# Patient Record
Sex: FEMALE | Race: WHITE | NOT HISPANIC OR LATINO | Employment: OTHER | URBAN - METROPOLITAN AREA
[De-identification: names, ages, dates, MRNs, and addresses within clinical notes are randomized per-mention and may not be internally consistent; named-entity substitution may affect disease eponyms.]

---

## 2020-10-23 ENCOUNTER — OFFICE VISIT (OUTPATIENT)
Dept: URGENT CARE | Facility: CLINIC | Age: 65
End: 2020-10-23
Payer: COMMERCIAL

## 2020-10-23 VITALS
WEIGHT: 140 LBS | SYSTOLIC BLOOD PRESSURE: 136 MMHG | BODY MASS INDEX: 22.5 KG/M2 | OXYGEN SATURATION: 97 % | TEMPERATURE: 98.6 F | HEART RATE: 89 BPM | HEIGHT: 66 IN | DIASTOLIC BLOOD PRESSURE: 82 MMHG | RESPIRATION RATE: 16 BRPM

## 2020-10-23 DIAGNOSIS — B96.89 BACTERIAL VAGINOSIS: Primary | ICD-10-CM

## 2020-10-23 DIAGNOSIS — R30.0 DYSURIA: ICD-10-CM

## 2020-10-23 DIAGNOSIS — N76.0 BACTERIAL VAGINOSIS: Primary | ICD-10-CM

## 2020-10-23 LAB
APPEARANCE UR: CLEAR
BILIRUB UR STRIP-MCNC: NEGATIVE MG/DL
COLOR UR AUTO: YELLOW
GLUCOSE UR STRIP.AUTO-MCNC: NEGATIVE MG/DL
KETONES UR STRIP.AUTO-MCNC: NEGATIVE MG/DL
LEUKOCYTE ESTERASE UR QL STRIP.AUTO: NEGATIVE
NITRITE UR QL STRIP.AUTO: NEGATIVE
PH UR STRIP.AUTO: 7 [PH] (ref 5–8)
PROT UR QL STRIP: NEGATIVE MG/DL
RBC UR QL AUTO: NEGATIVE
SP GR UR STRIP.AUTO: 1.01
UROBILINOGEN UR STRIP-MCNC: 0.2 MG/DL

## 2020-10-23 PROCEDURE — 99203 OFFICE O/P NEW LOW 30 MIN: CPT | Performed by: PHYSICIAN ASSISTANT

## 2020-10-23 PROCEDURE — 81002 URINALYSIS NONAUTO W/O SCOPE: CPT | Performed by: PHYSICIAN ASSISTANT

## 2020-10-23 RX ORDER — METRONIDAZOLE 7.5 MG/G
1 GEL VAGINAL
Qty: 5 EACH | Refills: 1 | Status: SHIPPED | OUTPATIENT
Start: 2020-10-23 | End: 2020-10-28

## 2020-10-23 RX ORDER — LISINOPRIL 20 MG/1
20 TABLET ORAL
COMMUNITY
Start: 2020-09-21 | End: 2021-06-30 | Stop reason: SDUPTHER

## 2020-10-23 ASSESSMENT — ENCOUNTER SYMPTOMS
VAGINITIS: 1
CHILLS: 0
FLANK PAIN: 0
FEVER: 0

## 2020-10-23 NOTE — PATIENT INSTRUCTIONS
Bacterial Vaginosis    Bacterial vaginosis is an infection of the vagina. It happens when too many normal germs (healthy bacteria) grow in the vagina. This infection puts you at risk for infections from sex (STIs). Treating this infection can lower your risk for some STIs. You should also treat this if you are pregnant. It can cause your baby to be born early.  Follow these instructions at home:  Medicines  · Take over-the-counter and prescription medicines only as told by your doctor.  · Take or use your antibiotic medicine as told by your doctor. Do not stop taking or using it even if you start to feel better.  General instructions  · If you your sexual partner is a woman, tell her that you have this infection. She needs to get treatment if she has symptoms. If you have a male partner, he does not need to be treated.  · During treatment:  ? Avoid sex.  ? Do not douche.  ? Avoid alcohol as told.  ? Avoid breastfeeding as told.  · Drink enough fluid to keep your pee (urine) clear or pale yellow.  · Keep your vagina and butt (rectum) clean.  ? Wash the area with warm water every day.  ? Wipe from front to back after you use the toilet.  · Keep all follow-up visits as told by your doctor. This is important.  Preventing this condition  · Do not douche.  · Use only warm water to wash around your vagina.  · Use protection when you have sex. This includes:  ? Latex condoms.  ? Dental dams.  · Limit how many people you have sex with. It is best to only have sex with the same person (be monogamous).  · Get tested for STIs. Have your partner get tested.  · Wear underwear that is cotton or lined with cotton.  · Avoid tight pants and pantyhose. This is most important in summer.  · Do not use any products that have nicotine or tobacco in them. These include cigarettes and e-cigarettes. If you need help quitting, ask your doctor.  · Do not use illegal drugs.  · Limit how much alcohol you drink.  Contact a doctor if:  · Your  symptoms do not get better, even after you are treated.  · You have more discharge or pain when you pee (urinate).  · You have a fever.  · You have pain in your belly (abdomen).  · You have pain with sex.  · Your bleed from your vagina between periods.  Summary  · This infection happens when too many germs (bacteria) grow in the vagina.  · Treating this condition can lower your risk for some infections from sex (STIs).  · You should also treat this if you are pregnant. It can cause early (premature) birth.  · Do not stop taking or using your antibiotic medicine even if you start to feel better.  This information is not intended to replace advice given to you by your health care provider. Make sure you discuss any questions you have with your health care provider.  Document Released: 09/26/2009 Document Revised: 11/30/2018 Document Reviewed: 09/02/2017  Elsevier Patient Education © 2020 [employer] Inc[employer]

## 2020-10-23 NOTE — PROGRESS NOTES
Subjective:      Alicia To is a 64 y.o. female who presents with Vaginal Discharge (over week, white discharge)    Medications:    • lisinopril Tabs    Allergies: Patient has no known allergies.    Problem List: Alicia To does not have a problem list on file.    Surgical History:  No past surgical history on file.    Past Social Hx: Alicia To  reports that she has been smoking. She has never used smokeless tobacco. She reports current alcohol use.     Past Family Hx:  Alicia To family history is not on file.     Problem list, medications, and allergies reviewed by myself today in Epic.         Patient presents with:  Vaginal Discharge: over week, white discharge. Pt has history of recurrent BV, a few times a year, typical symptoms for BV.  Thought she had a refill, but does not.  Pt denies new partner, rash , lesions, or any other complaint.  Pt is new to [facility] and does not have PCP so she came here.         Vaginitis  The patient's primary symptoms include genital itching, a genital odor and vaginal discharge. The patient's pertinent negatives include no genital rash. This is a new problem. The current episode started in the past 7 days. The problem occurs constantly. The problem has been gradually worsening. The problem affects both sides. She is not pregnant. Associated symptoms include dysuria (mild stinging). Pertinent negatives include no chills, fever, flank pain, frequency, hematuria, rash or urgency. The vaginal discharge was white, watery and malodorous. There has been no bleeding. She has not been passing clots. She has not been passing tissue. The symptoms are aggravated by tactile pressure and urinating. She has tried nothing for the symptoms. The treatment provided no relief. She is not sexually active. Her past medical history is significant for vaginosis.       Review of Systems   Constitutional: Negative for chills and fever.   Genitourinary: Positive for dysuria (mild stinging)  "and vaginal discharge. Negative for flank pain, frequency, hematuria and urgency.   Skin: Negative for rash.   All other systems reviewed and are negative.         Objective:     /82   Pulse 89   Temp 37 °C (98.6 °F) (Temporal)   Resp 16   Ht 1.666 m (5' 5.6\")   Wt 63.5 kg (140 lb)   SpO2 97%   BMI 22.87 kg/m²      Physical Exam  Vitals signs and nursing note reviewed.   Constitutional:       General: She is not in acute distress.     Appearance: Normal appearance. She is well-developed. She is not ill-appearing or toxic-appearing.   HENT:      Head: Normocephalic and atraumatic.      Right Ear: Tympanic membrane normal.      Left Ear: Tympanic membrane normal.      Nose: Nose normal.      Mouth/Throat:      Lips: Pink.      Mouth: Mucous membranes are moist.      Pharynx: Oropharynx is clear. Uvula midline.   Eyes:      Extraocular Movements: Extraocular movements intact.      Conjunctiva/sclera: Conjunctivae normal.      Pupils: Pupils are equal, round, and reactive to light.   Neck:      Musculoskeletal: Normal range of motion and neck supple.   Cardiovascular:      Rate and Rhythm: Normal rate and regular rhythm.      Pulses: Normal pulses.      Heart sounds: Normal heart sounds.   Pulmonary:      Effort: Pulmonary effort is normal.      Breath sounds: Normal breath sounds.   Abdominal:      General: Bowel sounds are normal.      Palpations: Abdomen is soft.   Genitourinary:     Comments: Deferred exam, patient has old Rx box for metrogel, typical onset and sxs.   Musculoskeletal: Normal range of motion.   Skin:     General: Skin is warm and dry.      Capillary Refill: Capillary refill takes less than 2 seconds.   Neurological:      General: No focal deficit present.      Mental Status: She is alert and oriented to person, place, and time.      Cranial Nerves: No cranial nerve deficit.      Motor: Motor function is intact.      Coordination: Coordination is intact.      Gait: Gait normal. "   Psychiatric:         Mood and Affect: Mood normal.            UA results interpreted by me: neg dip     Assessment/Plan:         1. Bacterial vaginosis  metroNIDAZOLE (METROGEL-VAGINAL) 0.75 % Gel   2. Dysuria  POCT Urinalysis    metroNIDAZOLE (METROGEL-VAGINAL) 0.75 % Gel     PT to begin medications today as prescribed.    PT should follow up with PCP in 1-2 days for re-evaluation if symptoms have not improved.  Discussed red flags and reasons to return to UC or ED.  Pt and/or family verbalized understanding of diagnosis and follow up instructions and was offered informational handout on diagnosis.  PT discharged.

## 2021-02-10 ENCOUNTER — TELEPHONE (OUTPATIENT)
Dept: SCHEDULING | Facility: IMAGING CENTER | Age: 66
End: 2021-02-10

## 2021-02-23 ENCOUNTER — OFFICE VISIT (OUTPATIENT)
Dept: URGENT CARE | Facility: CLINIC | Age: 66
End: 2021-02-23
Payer: COMMERCIAL

## 2021-02-23 ENCOUNTER — HOSPITAL ENCOUNTER (OUTPATIENT)
Facility: MEDICAL CENTER | Age: 66
End: 2021-02-23
Attending: FAMILY MEDICINE
Payer: COMMERCIAL

## 2021-02-23 VITALS
HEIGHT: 66 IN | WEIGHT: 133 LBS | OXYGEN SATURATION: 98 % | DIASTOLIC BLOOD PRESSURE: 86 MMHG | RESPIRATION RATE: 16 BRPM | TEMPERATURE: 97.1 F | BODY MASS INDEX: 21.38 KG/M2 | SYSTOLIC BLOOD PRESSURE: 132 MMHG | HEART RATE: 78 BPM

## 2021-02-23 DIAGNOSIS — M54.9 DORSALGIA: ICD-10-CM

## 2021-02-23 LAB
APPEARANCE UR: CLEAR
BILIRUB UR STRIP-MCNC: NEGATIVE MG/DL
COLOR UR AUTO: YELLOW
GLUCOSE UR STRIP.AUTO-MCNC: NEGATIVE MG/DL
KETONES UR STRIP.AUTO-MCNC: NEGATIVE MG/DL
LEUKOCYTE ESTERASE UR QL STRIP.AUTO: NEGATIVE
NITRITE UR QL STRIP.AUTO: NEGATIVE
PH UR STRIP.AUTO: 7 [PH] (ref 5–8)
PROT UR QL STRIP: NEGATIVE MG/DL
RBC UR QL AUTO: NORMAL
SP GR UR STRIP.AUTO: 1.01
UROBILINOGEN UR STRIP-MCNC: 0.2 MG/DL

## 2021-02-23 PROCEDURE — 87086 URINE CULTURE/COLONY COUNT: CPT

## 2021-02-23 PROCEDURE — 81002 URINALYSIS NONAUTO W/O SCOPE: CPT | Performed by: FAMILY MEDICINE

## 2021-02-23 PROCEDURE — 99214 OFFICE O/P EST MOD 30 MIN: CPT | Performed by: FAMILY MEDICINE

## 2021-02-23 RX ORDER — NITROFURANTOIN 25; 75 MG/1; MG/1
100 CAPSULE ORAL 2 TIMES DAILY
Qty: 10 CAPSULE | Refills: 0 | Status: SHIPPED | OUTPATIENT
Start: 2021-02-23 | End: 2021-02-28

## 2021-02-23 RX ORDER — KETOROLAC TROMETHAMINE 30 MG/ML
30 INJECTION, SOLUTION INTRAMUSCULAR; INTRAVENOUS ONCE
Status: COMPLETED | OUTPATIENT
Start: 2021-02-23 | End: 2021-02-23

## 2021-02-23 RX ADMIN — KETOROLAC TROMETHAMINE 30 MG: 30 INJECTION, SOLUTION INTRAMUSCULAR; INTRAVENOUS at 13:38

## 2021-02-23 ASSESSMENT — ENCOUNTER SYMPTOMS
MYALGIAS: 1
BACK PAIN: 1

## 2021-02-23 NOTE — PROGRESS NOTES
"Subjective:      Alicia To is a 65 y.o. female who presents with Back Pain (Lower left back pain x 3 days \"possible kidney infection\")    - This is a pleasant and nontoxic appearing 65 y.o. female with c/o lower Lt sided back/SI region pain for ~ 3-4 days. Fairly constant and worse w/ certain movements and positions. She is worried she has a kidney infection and needs abx.     No fever, no trauma, no bowel/bladder dysfunction or lower limb weakness/heaviness.             ALLERGIES:  Patient has no known allergies.     PMH:  History reviewed. No pertinent past medical history.     PSH:  History reviewed. No pertinent surgical history.    MEDS:    Current Outpatient Medications:   •  nitrofurantoin (MACROBID) 100 MG Cap, Take 1 capsule by mouth 2 times a day for 5 days., Disp: 10 capsule, Rfl: 0  •  lisinopril (PRINIVIL) 20 MG Tab, Take 20 mg by mouth every day., Disp: , Rfl:     Current Facility-Administered Medications:   •  ketorolac (TORADOL) injection 30 mg, 30 mg, Intramuscular, Once, Javad Mondragon M.D.    ** I have documented what I find to be significant in regards to past medical, social, family and surgical history  in my HPI or under PMH/PSH/FH review section, otherwise it is noncontributory **             HPI    Review of Systems   Musculoskeletal: Positive for back pain and myalgias.   All other systems reviewed and are negative.         Objective:     /86 (BP Location: Left arm, Patient Position: Sitting, BP Cuff Size: Adult)   Pulse 78   Temp 36.2 °C (97.1 °F) (Temporal)   Resp 16   Ht 1.676 m (5' 6\")   Wt 60.3 kg (133 lb)   SpO2 98%   BMI 21.47 kg/m²      Physical Exam  Vitals and nursing note reviewed.   Constitutional:       General: She is not in acute distress.     Appearance: She is well-developed. She is not diaphoretic.   HENT:      Head: Normocephalic and atraumatic.   Eyes:      General: No scleral icterus.     Conjunctiva/sclera: Conjunctivae normal.   Cardiovascular:    "   Heart sounds: Normal heart sounds. No murmur.   Pulmonary:      Effort: Pulmonary effort is normal. No respiratory distress.      Breath sounds: Normal breath sounds.   Abdominal:      Palpations: Abdomen is soft.      Tenderness: There is no abdominal tenderness.   Musculoskeletal:        Legs:       Comments: + TTP   Skin:     Coloration: Skin is not pale.      Findings: No rash.   Neurological:      Mental Status: She is alert.      Motor: No abnormal muscle tone.   Psychiatric:         Mood and Affect: Mood normal.         Behavior: Behavior normal.         Judgment: Judgment normal.                 Assessment/Plan:            1. Dorsalgia  POCT Urinalysis    ketorolac (TORADOL) injection 30 mg    nitrofurantoin (MACROBID) 100 MG Cap    URINE CULTURE(NEW)     ** seems more MSK. Trial otc motrin. Recheck UA w/ PCP appointment in couple weeks advised     - Dx, plan & d/c instructions discussed w/ patient and/or family members  - Rest, stay hydrated OTC Motrin and/or Tylenol as needed  - E.R. precautions discussed       Follow up with their PCP in 2-3 days strongly advised, ER if not improving or feeling/getting worse.    Any realistic side effects of medications that may have been given today (i.e. Rash, GI upset/constipation, sedation, elevation of BP or blood sugars) discussed.     Patient left in stable condition

## 2021-02-25 LAB
BACTERIA UR CULT: NORMAL
SIGNIFICANT IND 70042: NORMAL
SITE SITE: NORMAL
SOURCE SOURCE: NORMAL

## 2021-03-15 DIAGNOSIS — Z23 NEED FOR VACCINATION: ICD-10-CM

## 2021-03-15 SDOH — HEALTH STABILITY: PHYSICAL HEALTH: ON AVERAGE, HOW MANY MINUTES DO YOU ENGAGE IN EXERCISE AT THIS LEVEL?: 20 MINUTES

## 2021-03-15 SDOH — ECONOMIC STABILITY: HOUSING INSECURITY
IN THE LAST 12 MONTHS, WAS THERE A TIME WHEN YOU DID NOT HAVE A STEADY PLACE TO SLEEP OR SLEPT IN A SHELTER (INCLUDING NOW)?: NO

## 2021-03-15 SDOH — ECONOMIC STABILITY: HOUSING INSECURITY: IN THE LAST 12 MONTHS, HOW MANY PLACES HAVE YOU LIVED?: 2

## 2021-03-15 SDOH — ECONOMIC STABILITY: INCOME INSECURITY: IN THE LAST 12 MONTHS, WAS THERE A TIME WHEN YOU WERE NOT ABLE TO PAY THE MORTGAGE OR RENT ON TIME?: NO

## 2021-03-15 SDOH — ECONOMIC STABILITY: TRANSPORTATION INSECURITY
IN THE PAST 12 MONTHS, HAS LACK OF RELIABLE TRANSPORTATION KEPT YOU FROM MEDICAL APPOINTMENTS, MEETINGS, WORK OR FROM GETTING THINGS NEEDED FOR DAILY LIVING?: NO

## 2021-03-15 SDOH — HEALTH STABILITY: MENTAL HEALTH
STRESS IS WHEN SOMEONE FEELS TENSE, NERVOUS, ANXIOUS, OR CAN'T SLEEP AT NIGHT BECAUSE THEIR MIND IS TROUBLED. HOW STRESSED ARE YOU?: TO SOME EXTENT

## 2021-03-15 SDOH — HEALTH STABILITY: PHYSICAL HEALTH: ON AVERAGE, HOW MANY DAYS PER WEEK DO YOU ENGAGE IN MODERATE TO STRENUOUS EXERCISE (LIKE A BRISK WALK)?: 3 DAYS

## 2021-03-15 SDOH — ECONOMIC STABILITY: TRANSPORTATION INSECURITY
IN THE PAST 12 MONTHS, HAS THE LACK OF TRANSPORTATION KEPT YOU FROM MEDICAL APPOINTMENTS OR FROM GETTING MEDICATIONS?: NO

## 2021-03-15 ASSESSMENT — SOCIAL DETERMINANTS OF HEALTH (SDOH)
HOW OFTEN DO YOU HAVE SIX OR MORE DRINKS ON ONE OCCASION: WEEKLY
WITHIN THE PAST 12 MONTHS, THE FOOD YOU BOUGHT JUST DIDN'T LAST AND YOU DIDN'T HAVE MONEY TO GET MORE: NEVER TRUE
HOW HARD IS IT FOR YOU TO PAY FOR THE VERY BASICS LIKE FOOD, HOUSING, MEDICAL CARE, AND HEATING?: SOMEWHAT HARD
WITHIN THE PAST 12 MONTHS, YOU WORRIED THAT YOUR FOOD WOULD RUN OUT BEFORE YOU GOT THE MONEY TO BUY MORE: NEVER TRUE
IN A TYPICAL WEEK, HOW MANY TIMES DO YOU TALK ON THE PHONE WITH FAMILY, FRIENDS, OR NEIGHBORS?: MORE THAN THREE TIMES A WEEK
HOW MANY DRINKS CONTAINING ALCOHOL DO YOU HAVE ON A TYPICAL DAY WHEN YOU ARE DRINKING: 3 OR 4
HOW OFTEN DO YOU GET TOGETHER WITH FRIENDS OR RELATIVES?: ONCE A WEEK
DO YOU BELONG TO ANY CLUBS OR ORGANIZATIONS SUCH AS CHURCH GROUPS UNIONS, FRATERNAL OR ATHLETIC GROUPS, OR SCHOOL GROUPS?: NO
HOW OFTEN DO YOU ATTEND CHURCH OR RELIGIOUS SERVICES?: NEVER
HOW OFTEN DO YOU HAVE A DRINK CONTAINING ALCOHOL: 4 OR MORE TIMES A WEEK
HOW OFTEN DO YOU ATTENT MEETINGS OF THE CLUB OR ORGANIZATION YOU BELONG TO?: NEVER

## 2021-03-18 ENCOUNTER — OFFICE VISIT (OUTPATIENT)
Dept: MEDICAL GROUP | Facility: MEDICAL CENTER | Age: 66
End: 2021-03-18
Attending: FAMILY MEDICINE
Payer: COMMERCIAL

## 2021-03-18 VITALS
OXYGEN SATURATION: 95 % | DIASTOLIC BLOOD PRESSURE: 84 MMHG | TEMPERATURE: 97.5 F | BODY MASS INDEX: 23.18 KG/M2 | HEIGHT: 66 IN | RESPIRATION RATE: 16 BRPM | HEART RATE: 84 BPM | SYSTOLIC BLOOD PRESSURE: 128 MMHG | WEIGHT: 144.2 LBS

## 2021-03-18 DIAGNOSIS — F33.0 MILD EPISODE OF RECURRENT MAJOR DEPRESSIVE DISORDER (HCC): ICD-10-CM

## 2021-03-18 DIAGNOSIS — Z13.220 SCREENING CHOLESTEROL LEVEL: ICD-10-CM

## 2021-03-18 DIAGNOSIS — I10 ESSENTIAL HYPERTENSION: ICD-10-CM

## 2021-03-18 DIAGNOSIS — F17.200 TOBACCO DEPENDENCE: ICD-10-CM

## 2021-03-18 DIAGNOSIS — Z13.1 DIABETES MELLITUS SCREENING: ICD-10-CM

## 2021-03-18 PROBLEM — F32.9 MDD (MAJOR DEPRESSIVE DISORDER): Status: ACTIVE | Noted: 2021-03-18

## 2021-03-18 PROCEDURE — 99213 OFFICE O/P EST LOW 20 MIN: CPT | Performed by: FAMILY MEDICINE

## 2021-03-18 PROCEDURE — 99214 OFFICE O/P EST MOD 30 MIN: CPT | Performed by: FAMILY MEDICINE

## 2021-03-18 PROCEDURE — 99203 OFFICE O/P NEW LOW 30 MIN: CPT | Performed by: FAMILY MEDICINE

## 2021-03-18 RX ORDER — FLUOXETINE HYDROCHLORIDE 20 MG/1
20 CAPSULE ORAL DAILY
Qty: 7 CAPSULE | Refills: 0 | Status: SHIPPED | OUTPATIENT
Start: 2021-03-18 | End: 2021-03-18

## 2021-03-18 RX ORDER — FLUOXETINE HYDROCHLORIDE 40 MG/1
40 CAPSULE ORAL DAILY
Qty: 30 CAPSULE | Refills: 5 | Status: SHIPPED | OUTPATIENT
Start: 2021-03-18 | End: 2021-03-18

## 2021-03-18 RX ORDER — BUPROPION HYDROCHLORIDE 150 MG/1
150 TABLET ORAL EVERY MORNING
Qty: 30 TABLET | Refills: 5 | Status: SHIPPED | OUTPATIENT
Start: 2021-03-18 | End: 2021-04-20 | Stop reason: SDUPTHER

## 2021-03-18 ASSESSMENT — PATIENT HEALTH QUESTIONNAIRE - PHQ9
CLINICAL INTERPRETATION OF PHQ2 SCORE: 4
SUM OF ALL RESPONSES TO PHQ QUESTIONS 1-9: 9
5. POOR APPETITE OR OVEREATING: 2 - MORE THAN HALF THE DAYS

## 2021-03-18 NOTE — LETTER
[facility]  Jamia Hernandez M.D.  [address]  [place] [address]  Fax: [phone]   Authorization for Release/Disclosure of   Protected Health Information   Name: ALICIA LUGO : 1955 SSN: [id number]   Address: [address]  [place] [address] Phone:    [phone] (home)    I authorize the entity listed below to release/disclose the PHI below to:   [facility]/Jamia Hernandez M.D. and Jamia Hernandez M.D.   Provider or Entity Name:  [facility]    Address   [facility], [facility]  [address]  [place] [address] Phone:  [phone]    Fax:     Reason for request: continuity of care   Information to be released:    [  ] LAST COLONOSCOPY,  including any PATH REPORT and follow-up  [  ] LAST FIT/COLOGUARD RESULT [  ] LAST DEXA  [ X ] LAST MAMMOGRAM  [  ] LAST PAP  [  ] LAST LABS [  ] RETINA EXAM REPORT  [  ] IMMUNIZATION RECORDS  [  ] Release all info      [  ] Check here and initial the line next to each item to release ALL health information INCLUDING  _____ Care and treatment for drug and / or alcohol abuse  _____ HIV testing, infection status, or AIDS  _____ Genetic Testing    DATES OF SERVICE OR TIME PERIOD TO BE DISCLOSED: _____________  I understand and acknowledge that:  * This Authorization may be revoked at any time by you in writing, except if your health information has already been used or disclosed.  * Your health information that will be used or disclosed as a result of you signing this authorization could be re-disclosed by the recipient. If this occurs, your re-disclosed health information may no longer be protected by State or Federal laws.  * You may refuse to sign this Authorization. Your refusal will not affect your ability to obtain treatment.  * This Authorization becomes effective upon signing and will  on (date) __________.      If no date is indicated, this Authorization will  one (1) year from the signature date.    Name: Alicia  Zuleika    Signature:   Date:     3/18/2021       PLEASE FAX REQUESTED RECORDS BACK TO: [phone]

## 2021-03-18 NOTE — ASSESSMENT & PLAN NOTE
Has been treated with fluoxetine - took it for 2-3 years and was doing well so decided to stop it  Lost her job in the last year, had to sell her house, had to move from  to [place]. Recent divorce. Has been having difficulty with dealing with the cold  Poor appetite, feeling cold  Has never seen a therapist before  No SI/HI, never had any attempts on her life.   No AVH  Interested in restarting fluoxetine     Depression Screening    Little interest or pleasure in doing things?  2 - more than half the days   Feeling down, depressed , or hopeless? 2 - more than half the days   Trouble falling or staying asleep, or sleeping too much?  1 - several days   Feeling tired or having little energy?  1 - several days   Poor appetite or overeating?  2 - more than half the days   Feeling bad about yourself - or that you are a failure or have let yourself or your family down? 1 - several days   Trouble concentrating on things, such as reading the newspaper or watching television? 0 - not at all   Moving or speaking so slowly that other people could have noticed.  Or the opposite - being so fidgety or restless that you have been moving around a lot more than usual?  0 - not at all   Thoughts that you would be better off dead, or of hurting yourself?  0 - not at all   Patient Health Questionnaire Score: 9

## 2021-03-18 NOTE — LETTER
[facility]  Jamia Hernandez M.D.  [address]  [place] [address]  Fax: [phone]   Authorization for Release/Disclosure of   Protected Health Information   Name: ALICIA LUGO : 1955 SSN: [id number]   Address: [address] [place] [address] [address]  [place] [address] Phone:    [phone] (home)    I authorize the entity listed below to release/disclose the PHI below to:   [facility]/Jamia Hernandez M.D. and Jamia Hernandez M.D.   Provider or Entity Name:  [facility]   Address   [facility] [address] [facility] [address]   Phone:  [phone]    Fax:     Reason for request: continuity of care   Information to be released:    [  ] LAST COLONOSCOPY,  including any PATH REPORT and follow-up  [  ] LAST FIT/COLOGUARD RESULT [  ] LAST DEXA  [  ] LAST MAMMOGRAM  [  ] LAST PAP  [  ] LAST LABS [  ] RETINA EXAM REPORT  [  ] IMMUNIZATION RECORDS  [  ] Release all info      [  ] Check here and initial the line next to each item to release ALL health information INCLUDING  _____ Care and treatment for drug and / or alcohol abuse  _____ HIV testing, infection status, or AIDS  _____ Genetic Testing    DATES OF SERVICE OR TIME PERIOD TO BE DISCLOSED: _____________  I understand and acknowledge that:  * This Authorization may be revoked at any time by you in writing, except if your health information has already been used or disclosed.  * Your health information that will be used or disclosed as a result of you signing this authorization could be re-disclosed by the recipient. If this occurs, your re-disclosed health information may no longer be protected by State or Federal laws.  * You may refuse to sign this Authorization. Your refusal will not affect your ability to obtain treatment.  * This Authorization becomes effective upon signing and will  on (date) __________.      If no date is indicated, this Authorization will  one (1) year from the signature date.    Name: Alicia Lugo    Signature:   Date:     3/18/2021       PLEASE FAX  REQUESTED RECORDS BACK TO: [phone]

## 2021-03-18 NOTE — LETTER
[facility]  Jamia Hernandez M.D.  [address]  [place] [address]  Fax: [phone]   Authorization for Release/Disclosure of   Protected Health Information   Name: MINOR LUGO : 1955 SSN: [id number]   Address: [address] [place] [address] [address]  [place] [address] Phone:    [phone] (home)    I authorize the entity listed below to release/disclose the PHI below to:   [facility]/Jamia Hernandez M.D. and Jamia Hernandez M.D.   Provider or Entity Name:  Dr. Hany Cantu   Address   City, State, Zip   Phone:  [phone]    Fax:     Reason for request: continuity of care   Information to be released:    [ X ] LAST COLONOSCOPY,  including any PATH REPORT and follow-up  [  ] LAST FIT/COLOGUARD RESULT [  ] LAST DEXA  [  ] LAST MAMMOGRAM  [  ] LAST PAP  [  ] LAST LABS [  ] RETINA EXAM REPORT  [  ] IMMUNIZATION RECORDS  [  ] Release all info      [  ] Check here and initial the line next to each item to release ALL health information INCLUDING  _____ Care and treatment for drug and / or alcohol abuse  _____ HIV testing, infection status, or AIDS  _____ Genetic Testing    DATES OF SERVICE OR TIME PERIOD TO BE DISCLOSED: _____________  I understand and acknowledge that:  * This Authorization may be revoked at any time by you in writing, except if your health information has already been used or disclosed.  * Your health information that will be used or disclosed as a result of you signing this authorization could be re-disclosed by the recipient. If this occurs, your re-disclosed health information may no longer be protected by State or Federal laws.  * You may refuse to sign this Authorization. Your refusal will not affect your ability to obtain treatment.  * This Authorization becomes effective upon signing and will  on (date) __________.      If no date is indicated, this Authorization will  one (1) year from the signature date.    Name: Minor Lugo    Signature:   Date:     3/18/2021       PLEASE FAX  REQUESTED RECORDS BACK TO: [phone]

## 2021-03-18 NOTE — PROGRESS NOTES
Subjective:     CC:    Chief Complaint   Patient presents with   • Establish Care   • Depression       HISTORY OF THE PRESENT ILLNESS: Patient is a 65 y.o. female. This pleasant patient is here today to establish care and discuss the following issues.   His/her prior PCP was [facility].    Essential hypertension  Controlled on lisinpril 20    Mild episode of recurrent major depressive disorder (HCC)  Has been treated with fluoxetine - took it for 2-3 years and was doing well so decided to stop it  Lost her job in the last year, had to sell her house, had to move from  to [place]. Recent divorce. Has been having difficulty with dealing with the cold  Poor appetite, feeling cold  Has never seen a therapist before  No SI/HI, never had any attempts on her life.   No AVH  Interested in restarting fluoxetine     Depression Screening    Little interest or pleasure in doing things?  2 - more than half the days   Feeling down, depressed , or hopeless? 2 - more than half the days   Trouble falling or staying asleep, or sleeping too much?  1 - several days   Feeling tired or having little energy?  1 - several days   Poor appetite or overeating?  2 - more than half the days   Feeling bad about yourself - or that you are a failure or have let yourself or your family down? 1 - several days   Trouble concentrating on things, such as reading the newspaper or watching television? 0 - not at all   Moving or speaking so slowly that other people could have noticed.  Or the opposite - being so fidgety or restless that you have been moving around a lot more than usual?  0 - not at all   Thoughts that you would be better off dead, or of hurting yourself?  0 - not at all   Patient Health Questionnaire Score: 9         Tobacco dependence  Interested in cessation  About 19 year pack year history    Colonoscopy - maybe 5 years ago, she states it was clean.   Last mammo - 01/2020 - stable findings     Allergies: Patient has no  "known allergies.    Current Outpatient Medications Ordered in Epic   Medication Sig Dispense Refill   • buPROPion (WELLBUTRIN XL) 150 MG XL tablet Take 1 tablet by mouth every morning. 30 tablet 5   • lisinopril (PRINIVIL) 20 MG Tab Take 20 mg by mouth every day.       No current [facility]-ordered facility-administered medications on file.       History reviewed. No pertinent past medical history.    Past Surgical History:   Procedure Laterality Date   • HYSTERECTOMY LAPAROSCOPY  2006    for fibroids        Social History     Tobacco Use   • Smoking status: Current Every Day Smoker     Packs/day: 0.50     Years: 38.00     Pack years: 19.00     Types: Cigarettes   • Smokeless tobacco: Never Used   Substance Use Topics   • Alcohol use: Yes     Comment: 3 glasses of wine daily.    • Drug use: Never       Social History     Social History Narrative   • Not on file       Family History   Problem Relation Age of Onset   • Hypertension Mother    • Cancer Father 90        colon    • Cancer Sister 45        breast - BRCA neg   • Diabetes Neg Hx    • Heart Disease Neg Hx    • Stroke Neg Hx            Objective:     Exam: /84 (BP Location: Left arm, Patient Position: Sitting, BP Cuff Size: Adult)   Pulse 84   Temp 36.4 °C (97.5 °F) (Temporal)   Resp 16   Ht 1.676 m (5' 6\")   Wt 65.4 kg (144 lb 3.2 oz)   SpO2 95%  Body mass index is 23.27 kg/m².    General: Normal appearing. No distress.  Head: normocephalic  Eyes:  Eyes conjunctiva clear lids without ptosis  Neck: Supple. Thyroid is not enlarged.  Pulmonary: Clear to ausculation.  Normal effort. No rales, ronchi, or wheezing.  Cardiovascular: Regular rate and rhythm without murmur. Radial pulses are intact and equal bilaterally.  Abdomen: Soft, nontender, nondistended. Normal bowel sounds.  Neurologic: no facial droop, gait normal  Lymph: No cervical or supraclavicular lymph nodes are palpable  Skin: Warm and dry.  No obvious lesions.  Musculoskeletal: Normal gait. No " extremity cyanosis, clubbing, or edema.  Psych: depressedmood and affect. Alert and oriented x3. Judgment and insight is normal.      Labs:   None recent relevant labs    Assessment & Plan:   65 y.o. female with the following -    1. Mild episode of recurrent major depressive disorder (HCC)  - TSH WITH REFLEX TO FT4; Future  - buPROPion (WELLBUTRIN XL) 150 MG XL tablet; Take 1 tablet by mouth every morning.  Dispense: 30 tablet; Refill: 5  Mild depression - discussed restarting fluoxetine, but may have added benefit of smoking cessation with wellbutrin and she is agreeable to try a new medication for depression.     2. Essential hypertension  - Basic Metabolic Panel; Future    3. Diabetes mellitus screening  - HEMOGLOBIN A1C; Future    4. Screening cholesterol level  - Lipid Profile; Future    5. Tobacco dependence  Starting wellbutrin as above      Return in about 1 month (around 4/18/2021) for f/u depression.    Please note that this dictation was created using voice recognition software. I have made every reasonable attempt to correct obvious errors, but I expect that there are errors of grammar and possibly content that I did not discover before finalizing the note.

## 2021-04-06 ENCOUNTER — HOSPITAL ENCOUNTER (OUTPATIENT)
Dept: LAB | Facility: MEDICAL CENTER | Age: 66
End: 2021-04-06
Attending: FAMILY MEDICINE
Payer: COMMERCIAL

## 2021-04-06 DIAGNOSIS — F33.0 MILD EPISODE OF RECURRENT MAJOR DEPRESSIVE DISORDER (HCC): ICD-10-CM

## 2021-04-06 DIAGNOSIS — I10 ESSENTIAL HYPERTENSION: ICD-10-CM

## 2021-04-06 DIAGNOSIS — Z13.1 DIABETES MELLITUS SCREENING: ICD-10-CM

## 2021-04-06 DIAGNOSIS — Z13.220 SCREENING CHOLESTEROL LEVEL: ICD-10-CM

## 2021-04-06 LAB
ANION GAP SERPL CALC-SCNC: 10 MMOL/L (ref 7–16)
BUN SERPL-MCNC: 6 MG/DL (ref 8–22)
CALCIUM SERPL-MCNC: 9.6 MG/DL (ref 8.5–10.5)
CHLORIDE SERPL-SCNC: 94 MMOL/L (ref 96–112)
CHOLEST SERPL-MCNC: 267 MG/DL (ref 100–199)
CO2 SERPL-SCNC: 25 MMOL/L (ref 20–33)
CREAT SERPL-MCNC: 0.46 MG/DL (ref 0.5–1.4)
EST. AVERAGE GLUCOSE BLD GHB EST-MCNC: 134 MG/DL
GLUCOSE SERPL-MCNC: 85 MG/DL (ref 65–99)
HBA1C MFR BLD: 6.3 % (ref 4–5.6)
HDLC SERPL-MCNC: 61 MG/DL
LDLC SERPL CALC-MCNC: 167 MG/DL
POTASSIUM SERPL-SCNC: 5.1 MMOL/L (ref 3.6–5.5)
SODIUM SERPL-SCNC: 129 MMOL/L (ref 135–145)
TRIGL SERPL-MCNC: 197 MG/DL (ref 0–149)
TSH SERPL DL<=0.005 MIU/L-ACNC: 1.48 UIU/ML (ref 0.38–5.33)

## 2021-04-06 PROCEDURE — 80061 LIPID PANEL: CPT

## 2021-04-06 PROCEDURE — 83036 HEMOGLOBIN GLYCOSYLATED A1C: CPT

## 2021-04-06 PROCEDURE — 36415 COLL VENOUS BLD VENIPUNCTURE: CPT

## 2021-04-06 PROCEDURE — 80048 BASIC METABOLIC PNL TOTAL CA: CPT

## 2021-04-06 PROCEDURE — 84443 ASSAY THYROID STIM HORMONE: CPT

## 2021-04-08 DIAGNOSIS — E78.5 HYPERLIPIDEMIA, UNSPECIFIED HYPERLIPIDEMIA TYPE: ICD-10-CM

## 2021-04-08 RX ORDER — ATORVASTATIN CALCIUM 20 MG/1
20 TABLET, FILM COATED ORAL DAILY
Qty: 30 TABLET | Refills: 3 | Status: SHIPPED | OUTPATIENT
Start: 2021-04-08 | End: 2021-07-29

## 2021-04-20 ENCOUNTER — OFFICE VISIT (OUTPATIENT)
Dept: MEDICAL GROUP | Facility: MEDICAL CENTER | Age: 66
End: 2021-04-20
Attending: FAMILY MEDICINE
Payer: COMMERCIAL

## 2021-04-20 VITALS
HEART RATE: 77 BPM | WEIGHT: 146.6 LBS | DIASTOLIC BLOOD PRESSURE: 90 MMHG | OXYGEN SATURATION: 96 % | HEIGHT: 65 IN | TEMPERATURE: 97.2 F | SYSTOLIC BLOOD PRESSURE: 138 MMHG | RESPIRATION RATE: 16 BRPM | BODY MASS INDEX: 24.43 KG/M2

## 2021-04-20 DIAGNOSIS — R73.03 PREDIABETES: ICD-10-CM

## 2021-04-20 DIAGNOSIS — E78.49 OTHER HYPERLIPIDEMIA: ICD-10-CM

## 2021-04-20 DIAGNOSIS — Z12.31 ENCOUNTER FOR SCREENING MAMMOGRAM FOR MALIGNANT NEOPLASM OF BREAST: ICD-10-CM

## 2021-04-20 DIAGNOSIS — F33.0 MILD EPISODE OF RECURRENT MAJOR DEPRESSIVE DISORDER (HCC): ICD-10-CM

## 2021-04-20 PROCEDURE — 99214 OFFICE O/P EST MOD 30 MIN: CPT | Performed by: FAMILY MEDICINE

## 2021-04-20 RX ORDER — BUPROPION HYDROCHLORIDE 300 MG/1
300 TABLET ORAL EVERY MORNING
Qty: 30 TABLET | Refills: 3 | Status: SHIPPED | OUTPATIENT
Start: 2021-04-20 | End: 2021-06-30 | Stop reason: SDUPTHER

## 2021-04-20 NOTE — ASSESSMENT & PLAN NOTE
Reports worsening sleep  Mood is slightly better and more stabilized   Decreased appetite  Has helped some with smoking

## 2021-04-20 NOTE — PROGRESS NOTES
"Subjective:     CC:   Chief Complaint   Patient presents with   • Follow-Up         HPI:     Mild episode of recurrent major depressive disorder (HCC)  Reports worsening sleep  Mood is slightly better and more stabilized   Decreased appetite  Has helped some with smoking     Prediabetes  Working on decreasing carb intake    Other hyperlipidemia  Pt started on lipitor for elevated ASCVD and doing well on it. No abd pain, no muscle aches      History reviewed. No pertinent past medical history.    Social History     Tobacco Use   • Smoking status: Current Every Day Smoker     Packs/day: 0.50     Years: 38.00     Pack years: 19.00     Types: Cigarettes   • Smokeless tobacco: Never Used   Substance Use Topics   • Alcohol use: Yes     Comment: 3 glasses of wine daily.    • Drug use: Never       Current Outpatient Medications Ordered in Epic   Medication Sig Dispense Refill   • buPROPion (WELLBUTRIN XL) 300 MG XL tablet Take 1 tablet by mouth every morning. 30 tablet 3   • atorvastatin (LIPITOR) 20 MG Tab Take 1 tablet by mouth every day. 30 tablet 3   • lisinopril (PRINIVIL) 20 MG Tab Take 20 mg by mouth every day.       No current [facility]-ordered facility-administered medications on file.       Allergies:  Patient has no known allergies.        Objective:       Exam:  /90   Pulse 77   Temp 36.2 °C (97.2 °F) (Temporal)   Resp 16   Ht 1.651 m (5' 5\")   Wt 66.5 kg (146 lb 9.6 oz)   SpO2 96%   BMI 24.40 kg/m²  Body mass index is 24.4 kg/m².    Constitutional: Alert, no distress, well-groomed.  Skin: Warm, dry  Eye: Conjunctivae are normal. No scleral icterus.  ENMT: Lips without lesions, good dentition, moist mucous membranes.  Neck: Trachea midline, no masses  Respiratory: Unlabored respiratory effort, no cough.  MSK: moves all extremities.  Neuro: Grossly non-focal.   Psych: Alert and oriented x3, normal affect and mood.      Labs:   Reviewed labs 4/6/21  TSH normal, LDL/TG/total chol elevated, A1c 6.3, Sodium " 129    Assessment & Plan:     65 y.o. female with the following -     1. Mild episode of recurrent major depressive disorder (HCC)  - buPROPion (WELLBUTRIN XL) 300 MG XL tablet; Take 1 tablet by mouth every morning.  Dispense: 30 tablet; Refill: 3  Discussed potential for this increase to worsen her side effects. She will try 2 tabs, if she cannot tolerate then she can go back to 1 tab and let me know, will then switch to fluoxetine for further benefit on mood. Pt also given sleep hygiene handout. She will work on more exercise during the day.     2. Prediabetes  Counseled on low carb diet and exercise.     3. Other hyperlipidemia  Doing well on lipitor. Recheck in 3 months    4. Encounter for screening mammogram for malignant neoplasm of breast  - MA-SCREENING MAMMO BILAT W/TOMOSYNTHESIS W/CAD; Future      Return in about 6 weeks (around 6/1/2021) for f/u depression.    Please note that this dictation was created using voice recognition software. I have made every reasonable attempt to correct obvious errors, but I expect that there are errors of grammar and possibly content that I did not discover before finalizing the note.

## 2021-04-20 NOTE — LETTER
[facility]  Jamia Hernandez M.D.  [address]  [place] [address]  Fax: [phone]   Authorization for Release/Disclosure of   Protected Health Information   Name: ALICIA LUGO : 1955 SSN: [id number]   Address: [address] [place] [address] [address]  [place] [address] Phone:    [phone] (home)    I authorize the entity listed below to release/disclose the PHI below to:   [facility]/Jamia Hernandez M.D. and Jamia Hernandez M.D.   Provider or Entity Name:  Dr. Scooter Darden[facility]   Address   City, [facility]   Phone:      Fax:     Reason for request: continuity of care   Information to be released:    [  ] LAST COLONOSCOPY,  including any PATH REPORT and follow-up  [  ] LAST FIT/COLOGUARD RESULT [  ] LAST DEXA  [ X ] LAST MAMMOGRAM  [ X ] LAST PAP  [  ] LAST LABS [  ] RETINA EXAM REPORT  [  ] IMMUNIZATION RECORDS  [ X ] Release all info      [  ] Check here and initial the line next to each item to release ALL health information INCLUDING  _____ Care and treatment for drug and / or alcohol abuse  _____ HIV testing, infection status, or AIDS  _____ Genetic Testing    DATES OF SERVICE OR TIME PERIOD TO BE DISCLOSED: _____________  I understand and acknowledge that:  * This Authorization may be revoked at any time by you in writing, except if your health information has already been used or disclosed.  * Your health information that will be used or disclosed as a result of you signing this authorization could be re-disclosed by the recipient. If this occurs, your re-disclosed health information may no longer be protected by State or Federal laws.  * You may refuse to sign this Authorization. Your refusal will not affect your ability to obtain treatment.  * This Authorization becomes effective upon signing and will  on (date) __________.      If no date is indicated, this Authorization will  one (1) year from the signature date.    Name: Alicia Lugo    Signature:   Date:          4/20/2021       PLEASE FAX REQUESTED RECORDS BACK TO: [phone]

## 2021-05-21 ENCOUNTER — HOSPITAL ENCOUNTER (OUTPATIENT)
Dept: RADIOLOGY | Facility: MEDICAL CENTER | Age: 66
End: 2021-05-21
Attending: FAMILY MEDICINE
Payer: COMMERCIAL

## 2021-05-21 DIAGNOSIS — Z12.31 ENCOUNTER FOR SCREENING MAMMOGRAM FOR MALIGNANT NEOPLASM OF BREAST: ICD-10-CM

## 2021-05-21 PROCEDURE — 77063 BREAST TOMOSYNTHESIS BI: CPT

## 2021-05-24 ENCOUNTER — HOSPITAL ENCOUNTER (OUTPATIENT)
Dept: RADIOLOGY | Facility: MEDICAL CENTER | Age: 66
End: 2021-05-24
Payer: COMMERCIAL

## 2021-06-30 ENCOUNTER — OFFICE VISIT (OUTPATIENT)
Dept: MEDICAL GROUP | Facility: MEDICAL CENTER | Age: 66
End: 2021-06-30
Attending: FAMILY MEDICINE
Payer: COMMERCIAL

## 2021-06-30 VITALS
OXYGEN SATURATION: 99 % | WEIGHT: 147.4 LBS | SYSTOLIC BLOOD PRESSURE: 122 MMHG | RESPIRATION RATE: 18 BRPM | BODY MASS INDEX: 23.69 KG/M2 | TEMPERATURE: 98.5 F | HEIGHT: 66 IN | DIASTOLIC BLOOD PRESSURE: 74 MMHG | HEART RATE: 86 BPM

## 2021-06-30 DIAGNOSIS — F33.0 MILD EPISODE OF RECURRENT MAJOR DEPRESSIVE DISORDER (HCC): ICD-10-CM

## 2021-06-30 DIAGNOSIS — L98.9 SKIN LESIONS: ICD-10-CM

## 2021-06-30 DIAGNOSIS — I10 ESSENTIAL HYPERTENSION: ICD-10-CM

## 2021-06-30 DIAGNOSIS — Z12.11 SCREENING FOR COLON CANCER: ICD-10-CM

## 2021-06-30 DIAGNOSIS — F17.200 TOBACCO DEPENDENCE: ICD-10-CM

## 2021-06-30 PROCEDURE — 99213 OFFICE O/P EST LOW 20 MIN: CPT | Performed by: FAMILY MEDICINE

## 2021-06-30 PROCEDURE — 99214 OFFICE O/P EST MOD 30 MIN: CPT | Performed by: FAMILY MEDICINE

## 2021-06-30 RX ORDER — BUPROPION HYDROCHLORIDE 150 MG/1
150 TABLET ORAL EVERY MORNING
Qty: 30 TABLET | Refills: 3 | Status: SHIPPED | OUTPATIENT
Start: 2021-06-30 | End: 2022-05-24

## 2021-06-30 RX ORDER — LISINOPRIL 20 MG/1
20 TABLET ORAL
Qty: 90 TABLET | Refills: 1 | Status: SHIPPED | OUTPATIENT
Start: 2021-06-30 | End: 2021-12-20

## 2021-06-30 ASSESSMENT — PATIENT HEALTH QUESTIONNAIRE - PHQ9: CLINICAL INTERPRETATION OF PHQ2 SCORE: 0

## 2021-06-30 NOTE — PROGRESS NOTES
"Subjective:     CC:   Chief Complaint   Patient presents with   • Follow-Up     needs referral to dermatology and follow up depression         HPI:     Mild episode of recurrent major depressive disorder (HCC)  phq9 = 0 today  She states she is feeling better, happy about moving here and looking forward   Has remained on 150mg of wellbutrin XL and would like to continue on this for now.    Tobacco dependence  Has been using about 1/2 ppd.   Wellbutrin helped a little bit and would like to keep it on  She is also tried using Nicorette gum in the past, which is also helped her.    Skin lesions  Patient has 2 separate skin lesions on her right posterior back, that she is worried about.   she is interested in having them removed      No past medical history on file.    Social History     Tobacco Use   • Smoking status: Current Every Day Smoker     Packs/day: 0.50     Years: 38.00     Pack years: 19.00     Types: Cigarettes   • Smokeless tobacco: Never Used   Vaping Use   • Vaping Use: Never used   Substance Use Topics   • Alcohol use: Yes     Comment: 3 glasses of wine daily.    • Drug use: Never       Current Outpatient Medications Ordered in Epic   Medication Sig Dispense Refill   • buPROPion (WELLBUTRIN XL) 150 MG XL tablet Take 1 tablet by mouth every morning. 30 tablet 3   • nicotine polacrilex (NICORETTE) 4 MG gum Take 4 mg by mouth as needed. 270 Each 6   • lisinopril (PRINIVIL) 20 MG Tab Take 1 tablet by mouth every day. 90 tablet 1   • atorvastatin (LIPITOR) 20 MG Tab Take 1 tablet by mouth every day. 30 tablet 3     No current Epic-ordered facility-administered medications on file.       Allergies:  Patient has no known allergies.      Objective:       Exam:  /74 (BP Location: Right arm, Patient Position: Sitting, BP Cuff Size: Adult)   Pulse 86   Temp 36.9 °C (98.5 °F) (Temporal)   Resp 18   Ht 1.664 m (5' 5.5\")   Wt 66.9 kg (147 lb 6.4 oz)   SpO2 99%   BMI 24.16 kg/m²  Body mass index is 24.16 " kg/m².    Gen: No apparent distress.  Neck: Neck is supple   Abd: NABS, soft, nontender, nondistended  Derm:   Inferior to the right scapula - seborrheic keratosis  Right mid flank - raised hemangioma   Psy: Alert and oriented, Normal mood and affect. Judgment normal      Labs:   None new    Assessment & Plan:     65 y.o. female with the following -     1. Mild episode of recurrent major depressive disorder (HCC)  - buPROPion (WELLBUTRIN XL) 150 MG XL tablet; Take 1 tablet by mouth every morning.  Dispense: 30 tablet; Refill: 3  We will continue Wellbutrin 150 mg daily.    2. Tobacco dependence  - nicotine polacrilex (NICORETTE) 4 MG gum; Take 4 mg by mouth as needed.  Dispense: 270 Each; Refill: 6  Patient encouraged on tobacco cessation.  Will start gum.  Patient counseled on use    3. Essential hypertension  - lisinopril (PRINIVIL) 20 MG Tab; Take 1 tablet by mouth every day.  Dispense: 90 tablet; Refill: 1  Continue lisinopril, blood pressure is well controlled today    4. Screening for colon cancer  - REFERRAL TO GI FOR COLONOSCOPY  Patient is due in September of this year, will send in referral right now for 5-year recall    5. Skin lesions  We will schedule patient for 2 shave biopsies of her skin lesions.    Return in about 1 month (around 7/30/2021) for shave biopsy.     We will also perform vaginal exam to see if she still has a cervix after her hysterectomy.  I discussed this with patient and she is agreeable.    Please note that this dictation was created using voice recognition software. I have made every reasonable attempt to correct obvious errors, but I expect that there are errors of grammar and possibly content that I did not discover before finalizing the note.

## 2021-06-30 NOTE — ASSESSMENT & PLAN NOTE
Patient has 2 separate skin lesions on her right posterior back, that she is worried about.   she is interested in having them removed

## 2021-06-30 NOTE — ASSESSMENT & PLAN NOTE
phq9 = 0 today  She states she is feeling better, happy about moving here and looking forward   Has remained on 150mg of wellbutrin XL and would like to continue on this for now.

## 2021-06-30 NOTE — ASSESSMENT & PLAN NOTE
Has been using about 1/2 ppd.   Wellbutrin helped a little bit and would like to keep it on  She is also tried using Nicorette gum in the past, which is also helped her.

## 2021-07-30 ENCOUNTER — HOSPITAL ENCOUNTER (OUTPATIENT)
Facility: MEDICAL CENTER | Age: 66
End: 2021-07-30
Attending: FAMILY MEDICINE
Payer: COMMERCIAL

## 2021-07-30 ENCOUNTER — OFFICE VISIT (OUTPATIENT)
Dept: MEDICAL GROUP | Facility: MEDICAL CENTER | Age: 66
End: 2021-07-30
Attending: FAMILY MEDICINE
Payer: COMMERCIAL

## 2021-07-30 VITALS
RESPIRATION RATE: 16 BRPM | TEMPERATURE: 97.5 F | OXYGEN SATURATION: 98 % | BODY MASS INDEX: 23.61 KG/M2 | WEIGHT: 146.9 LBS | SYSTOLIC BLOOD PRESSURE: 126 MMHG | DIASTOLIC BLOOD PRESSURE: 72 MMHG | HEART RATE: 88 BPM | HEIGHT: 66 IN

## 2021-07-30 DIAGNOSIS — Z90.710 HISTORY OF HYSTERECTOMY: ICD-10-CM

## 2021-07-30 DIAGNOSIS — L98.9 SKIN LESIONS: ICD-10-CM

## 2021-07-30 LAB — PATHOLOGY CONSULT NOTE: NORMAL

## 2021-07-30 PROCEDURE — 11102 TANGNTL BX SKIN SINGLE LES: CPT | Performed by: FAMILY MEDICINE

## 2021-07-30 PROCEDURE — 96372 THER/PROPH/DIAG INJ SC/IM: CPT | Performed by: FAMILY MEDICINE

## 2021-07-30 PROCEDURE — 88305 TISSUE EXAM BY PATHOLOGIST: CPT

## 2021-07-30 PROCEDURE — 99212 OFFICE O/P EST SF 10 MIN: CPT | Mod: 25 | Performed by: FAMILY MEDICINE

## 2021-07-30 PROCEDURE — 99213 OFFICE O/P EST LOW 20 MIN: CPT | Mod: 25 | Performed by: FAMILY MEDICINE

## 2021-07-30 PROCEDURE — 700101 HCHG RX REV CODE 250: Performed by: FAMILY MEDICINE

## 2021-07-30 RX ADMIN — LIDOCAINE HYDROCHLORIDE 5 ML: 10; .005 INJECTION, SOLUTION EPIDURAL; INFILTRATION; INTRACAUDAL; PERINEURAL at 14:10

## 2021-07-30 NOTE — PROGRESS NOTES
"Subjective:     CC:   Chief Complaint   Patient presents with   • Skin Lesion     removal of lesions on back   • Gynecologic Exam         HPI:     Skin lesions  Patient here for skin lesion removal. There is only one that she would like removed.     History of hysterectomy  Patient had hysterectomy done 10-15 years ago, not sure about presence of cervix. Here today for vaginal exam.       History reviewed. No pertinent past medical history.    Social History     Tobacco Use   • Smoking status: Current Every Day Smoker     Packs/day: 0.50     Years: 38.00     Pack years: 19.00     Types: Cigarettes   • Smokeless tobacco: Never Used   Vaping Use   • Vaping Use: Never used   Substance Use Topics   • Alcohol use: Yes     Comment: 3 glasses of wine daily.    • Drug use: Never       Current Outpatient Medications Ordered in Epic   Medication Sig Dispense Refill   • atorvastatin (LIPITOR) 20 MG Tab TAKE 1 TABLET BY MOUTH EVERY DAY 90 tablet 1   • buPROPion (WELLBUTRIN XL) 150 MG XL tablet Take 1 tablet by mouth every morning. 30 tablet 3   • nicotine polacrilex (NICORETTE) 4 MG gum Take 4 mg by mouth as needed. 270 Each 6   • lisinopril (PRINIVIL) 20 MG Tab Take 1 tablet by mouth every day. 90 tablet 1     No current Epic-ordered facility-administered medications on file.       Allergies:  Patient has no known allergies.        Objective:       Exam:  /72   Pulse 88   Temp 36.4 °C (97.5 °F) (Temporal)   Resp 16   Ht 1.664 m (5' 5.51\")   Wt 66.6 kg (146 lb 14.4 oz)   SpO2 98%   BMI 24.06 kg/m²  Body mass index is 24.06 kg/m².    Gen: No apparent distress.  Derm:   Likely SK right mid thoracic back  Pelvic exam:  Perineum: No external lesions are noted, color is symmetrical throughout  Vagina: Vaginal vault is well rugated.  Cervix: surgically absent    Psy: Alert and oriented, Normal mood and affect. Judgment normal      Labs:   None recent    Assessment & Plan:     65 y.o. female with the following -     1. " Skin lesions  - lidocaine-epinephrine 1% [id number] injection 5 mL  - Pathology Specimen  Shave biopsy done today at patient's request. Pathology specimen sent     2. History of hysterectomy  No cervix noted on exam today. Hysterectomy done for fibroid uterus. Removing pap smear requirement from patient's healthcare maintenance.    SHAVE BIOPSY PROCEDURE NOTE:    Location of lesion: right mid thoracic back     Reason for removal: Rule out neoplasm of skin    Discussed with the patient the risks, benefits and alternatives to excision of the lesion. Informed consent was obtained and time out performed. The area was alcohol swabbed and 1 cc of 1% lidocaine with epinephrine was administered. The area was then prepped and draped in the usual sterile fashion. A Dermablade was used to excise the lesion. Hemostasis was obtained with gentle pressure.  Bandage was applied. The patient was given wound care instructions and follow-up precautions. Specimen was approximately 2 mm by 2 mm.  The specimen was placed in a pathology jar and sent to the lab.          Return if symptoms worsen or fail to improve.    Please note that this dictation was created using voice recognition software. I have made every reasonable attempt to correct obvious errors, but I expect that there are errors of grammar and possibly content that I did not discover before finalizing the note.

## 2021-07-30 NOTE — ASSESSMENT & PLAN NOTE
Patient had hysterectomy done 10-15 years ago, not sure about presence of cervix. Here today for vaginal exam.

## 2021-11-01 ENCOUNTER — TELEPHONE (OUTPATIENT)
Dept: MEDICAL GROUP | Facility: MEDICAL CENTER | Age: 66
End: 2021-11-01

## 2021-11-01 NOTE — TELEPHONE ENCOUNTER
VOICEMAIL  1. Caller Name: Alicia oT                        Call Back Number: [phone] (home)       2. Message: Alicia called and left a voicemail wondering where she can get the booster for the J&J vaccine since that is the one she had to begin with.     3. Patient approves office to leave a detailed voicemail/MyChart message: yes      Phone Number Called: [phone] (home)       Call outcome: Left detailed message for patient. Informed to call back with any additional questions.    Message: Informed patient we were not giving out Covid vaccines or boosters at this time. Informed her to reach out to the different pharmacies near her, and the place she got her vaccine originally to see if they have the booster available.

## 2022-01-21 DIAGNOSIS — E78.5 HYPERLIPIDEMIA, UNSPECIFIED HYPERLIPIDEMIA TYPE: ICD-10-CM

## 2022-01-21 NOTE — TELEPHONE ENCOUNTER
Received request via: Pharmacy    Was the patient seen in the last year in this department? Yes    Does the patient have an active prescription (recently filled or refills available) for medication(s) requested? No     Last seen 7/30/21

## 2022-01-24 RX ORDER — ATORVASTATIN CALCIUM 20 MG/1
20 TABLET, FILM COATED ORAL DAILY
Qty: 30 TABLET | Refills: 5 | Status: SHIPPED | OUTPATIENT
Start: 2022-01-24 | End: 2022-07-26

## 2022-02-08 DIAGNOSIS — R73.03 PREDIABETES: ICD-10-CM

## 2022-02-08 DIAGNOSIS — I10 ESSENTIAL HYPERTENSION: ICD-10-CM

## 2022-02-08 DIAGNOSIS — E78.49 OTHER HYPERLIPIDEMIA: ICD-10-CM

## 2022-04-20 ENCOUNTER — HOSPITAL ENCOUNTER (OUTPATIENT)
Dept: LAB | Facility: MEDICAL CENTER | Age: 67
End: 2022-04-20
Attending: FAMILY MEDICINE
Payer: MEDICAID

## 2022-04-20 DIAGNOSIS — I10 ESSENTIAL HYPERTENSION: ICD-10-CM

## 2022-04-20 DIAGNOSIS — E78.49 OTHER HYPERLIPIDEMIA: ICD-10-CM

## 2022-04-20 DIAGNOSIS — R73.03 PREDIABETES: ICD-10-CM

## 2022-04-20 LAB
ANION GAP SERPL CALC-SCNC: 13 MMOL/L (ref 7–16)
BUN SERPL-MCNC: 6 MG/DL (ref 8–22)
CALCIUM SERPL-MCNC: 9.3 MG/DL (ref 8.4–10.2)
CHLORIDE SERPL-SCNC: 95 MMOL/L (ref 96–112)
CHOLEST SERPL-MCNC: 206 MG/DL (ref 100–199)
CO2 SERPL-SCNC: 24 MMOL/L (ref 20–33)
CREAT SERPL-MCNC: 0.4 MG/DL (ref 0.5–1.4)
EST. AVERAGE GLUCOSE BLD GHB EST-MCNC: 117 MG/DL
FASTING STATUS PATIENT QL REPORTED: NORMAL
GFR SERPLBLD CREATININE-BSD FMLA CKD-EPI: 110 ML/MIN/1.73 M 2
GLUCOSE SERPL-MCNC: 101 MG/DL (ref 65–99)
HBA1C MFR BLD: 5.7 % (ref 4–5.6)
HDLC SERPL-MCNC: 72 MG/DL
LDLC SERPL CALC-MCNC: 73 MG/DL
POTASSIUM SERPL-SCNC: 4 MMOL/L (ref 3.6–5.5)
SODIUM SERPL-SCNC: 132 MMOL/L (ref 135–145)
TRIGL SERPL-MCNC: 307 MG/DL (ref 0–149)

## 2022-04-20 PROCEDURE — 80061 LIPID PANEL: CPT

## 2022-04-20 PROCEDURE — 36415 COLL VENOUS BLD VENIPUNCTURE: CPT

## 2022-04-20 PROCEDURE — 80048 BASIC METABOLIC PNL TOTAL CA: CPT

## 2022-04-20 PROCEDURE — 83036 HEMOGLOBIN GLYCOSYLATED A1C: CPT

## 2022-05-24 ENCOUNTER — OFFICE VISIT (OUTPATIENT)
Dept: MEDICAL GROUP | Facility: MEDICAL CENTER | Age: 67
End: 2022-05-24
Attending: FAMILY MEDICINE
Payer: MEDICAID

## 2022-05-24 VITALS
SYSTOLIC BLOOD PRESSURE: 126 MMHG | HEIGHT: 65 IN | RESPIRATION RATE: 16 BRPM | WEIGHT: 149.7 LBS | OXYGEN SATURATION: 98 % | HEART RATE: 79 BPM | TEMPERATURE: 97.7 F | BODY MASS INDEX: 24.94 KG/M2 | DIASTOLIC BLOOD PRESSURE: 74 MMHG

## 2022-05-24 DIAGNOSIS — Z78.9 UNHEALTHY ALCOHOL DRINKING BEHAVIOR: ICD-10-CM

## 2022-05-24 DIAGNOSIS — R19.7 DIARRHEA, UNSPECIFIED TYPE: ICD-10-CM

## 2022-05-24 PROCEDURE — 99214 OFFICE O/P EST MOD 30 MIN: CPT | Performed by: FAMILY MEDICINE

## 2022-05-24 PROCEDURE — 99213 OFFICE O/P EST LOW 20 MIN: CPT | Performed by: FAMILY MEDICINE

## 2022-05-24 ASSESSMENT — PATIENT HEALTH QUESTIONNAIRE - PHQ9
SUM OF ALL RESPONSES TO PHQ QUESTIONS 1-9: 7
CLINICAL INTERPRETATION OF PHQ2 SCORE: 2
5. POOR APPETITE OR OVEREATING: 3 - NEARLY EVERY DAY

## 2022-05-24 NOTE — PROGRESS NOTES
"Subjective:     CC:   Chief Complaint   Patient presents with   • Diarrhea   • Loss of Appetite         HPI:     Diarrhea  Pt reports poor appetite, diarrhea for the last 4-5 months. Diarrhea is loose and sometimes watery. She started a fiber supplement yesterday.   She had a colonoscopy for screening 01/22 recall in 3 months for polyps   She has not had any weight loss.   She has not noted any abdominal pain. Denies issues with eating food but doesn't have appetite. Eating food does not give her any symptoms  She thinks this has happened before, resolved with fiber intake  She does drink alcohol 4 glasses of wine per night.      No past medical history on file.    Social History     Tobacco Use   • Smoking status: Current Every Day Smoker     Packs/day: 0.50     Years: 38.00     Pack years: 19.00     Types: Cigarettes   • Smokeless tobacco: Never Used   Vaping Use   • Vaping Use: Never used   Substance Use Topics   • Alcohol use: Yes     Comment: 4 glasses of wine daily.   • Drug use: Never       Current Outpatient Medications Ordered in Epic   Medication Sig Dispense Refill   • lisinopril (PRINIVIL) 20 MG Tab TAKE 1 TABLET BY MOUTH EVERY DAY 90 Tablet 0   • atorvastatin (LIPITOR) 20 MG Tab TAKE 1 TABLET BY MOUTH EVERY DAY 30 Tablet 5   • nicotine polacrilex (NICORETTE) 4 MG gum Take 4 mg by mouth as needed. 270 Each 6     No current Epic-ordered facility-administered medications on file.       Allergies:  Patient has no known allergies.      Objective:       Exam:  /74 (BP Location: Left arm, Patient Position: Sitting, BP Cuff Size: Adult)   Pulse 79   Temp 36.5 °C (97.7 °F) (Temporal)   Resp 16   Ht 1.651 m (5' 5\")   Wt 67.9 kg (149 lb 11.2 oz)   SpO2 98%   BMI 24.91 kg/m²  Body mass index is 24.91 kg/m².    General: Normal appearing. No distress.  Pulmonary: Normal effort.   Abdomen: Soft, nondistended. Normal bowel sounds. Mildly tender LLQ  Musculoskeletal: Normal gait. No extremity cyanosis, " clubbing, or edema.  Psych: Normal mood and affect. Alert and oriented x3. Judgment and insight is normal.      Labs:   Last bmp without abnormalities besides mild hyponatremia    Assessment & Plan:     66 y.o. female with the following -     1. Diarrhea, unspecified type  - H.PYLORI STOOL ANTIGEN; Future  - Basic Metabolic Panel; Future  - HEPATIC FUNCTION PANEL; Future  - NEUTRAL FAT; Future  - LIPASE; Future  - US-ABDOMEN COMPLETE SURVEY; Future  Patient symptoms could certainly be related to her alcohol use, consider chronic pancreatitis, however she does not have any abdominal pain and her discomfort is not associated with food.  She also does not have any alarm symptoms, including weight loss, jaundice, abdominal pain.  We will check her stools for fat as she does report that it does appear greasy.  Obtain lipase.  Ultrasound of the abdomen to visualize the pancreas, may need CT scan in the future.  We will also test for H. pylori.  Obtain LFTs    2. Unhealthy alcohol drinking behavior  Patient counseled on decreased use of alcohol.  She does report that it does seem to be difficult to decrease her daily use.  She will work on it at home, may need to consider medications for decreased alcohol cravings.    Return in about 1 month (around 6/24/2022) for f/u diarrhea.    Please note that this dictation was created using voice recognition software. I have made every reasonable attempt to correct obvious errors, but I expect that there are errors of grammar and possibly content that I did not discover before finalizing the note.

## 2022-05-24 NOTE — LETTER
[employer]  Jamia Hernandez M.D.  [address]  [place] [address]  Fax: [phone]   Authorization for Release/Disclosure of   Protected Health Information   Name: ALICIA LUGO : 1955 SSN: [id number]   Address: [address] [place] [address] [address]  [place] [address] Phone:    [phone] (home)    I authorize the entity listed below to release/disclose the PHI below to:   [facility]/Jamia Hernandez M.D. and Jamia Hernandez M.D.   Provider or Entity Name:  [facility]   Address   City, State, [facility]   Phone:      Fax:     Reason for request: continuity of care   Information to be released:    [ X ] LAST COLONOSCOPY,  including any PATH REPORT and follow-up  [  ] LAST FIT/COLOGUARD RESULT [  ] LAST DEXA  [  ] LAST MAMMOGRAM  [  ] LAST PAP  [  ] LAST LABS [  ] RETINA EXAM REPORT  [  ] IMMUNIZATION RECORDS  [  ] Release all info      [  ] Check here and initial the line next to each item to release ALL health information INCLUDING  _____ Care and treatment for drug and / or alcohol abuse  _____ HIV testing, infection status, or AIDS  _____ Genetic Testing    DATES OF SERVICE OR TIME PERIOD TO BE DISCLOSED: _____________  I understand and acknowledge that:  * This Authorization may be revoked at any time by you in writing, except if your health information has already been used or disclosed.  * Your health information that will be used or disclosed as a result of you signing this authorization could be re-disclosed by the recipient. If this occurs, your re-disclosed health information may no longer be protected by State or Federal laws.  * You may refuse to sign this Authorization. Your refusal will not affect your ability to obtain treatment.  * This Authorization becomes effective upon signing and will  on (date) __________.      If no date is indicated, this Authorization will  one (1) year from the signature date.    Name: Alicia Lugo    Signature:   Date:     2022       PLEASE FAX  REQUESTED RECORDS BACK TO: [phone]

## 2022-05-24 NOTE — ASSESSMENT & PLAN NOTE
Pt reports poor appetite, diarrhea for the last 4-5 months. Diarrhea is loose and sometimes watery. She started a fiber supplement yesterday.   She had a colonoscopy for screening 01/22 recall in 3 months for polyps   She has not had any weight loss.   She has not noted any abdominal pain. Denies issues with eating food but doesn't have appetite. Eating food does not give her any symptoms  She thinks this has happened before, resolved with fiber intake  She does drink alcohol 4 glasses of wine per night.

## 2022-05-31 ENCOUNTER — HOSPITAL ENCOUNTER (OUTPATIENT)
Dept: LAB | Facility: MEDICAL CENTER | Age: 67
End: 2022-05-31
Attending: FAMILY MEDICINE
Payer: MEDICAID

## 2022-05-31 DIAGNOSIS — R19.7 DIARRHEA, UNSPECIFIED TYPE: ICD-10-CM

## 2022-05-31 LAB
ALBUMIN SERPL BCP-MCNC: 4.7 G/DL (ref 3.2–4.9)
ALP SERPL-CCNC: 97 U/L (ref 30–99)
ALT SERPL-CCNC: 36 U/L (ref 2–50)
ANION GAP SERPL CALC-SCNC: 11 MMOL/L (ref 7–16)
AST SERPL-CCNC: 32 U/L (ref 12–45)
BILIRUB CONJ SERPL-MCNC: <0.2 MG/DL (ref 0.1–0.5)
BILIRUB INDIRECT SERPL-MCNC: NORMAL MG/DL (ref 0–1)
BILIRUB SERPL-MCNC: 0.3 MG/DL (ref 0.1–1.5)
BUN SERPL-MCNC: 8 MG/DL (ref 8–22)
CALCIUM SERPL-MCNC: 9.5 MG/DL (ref 8.5–10.5)
CHLORIDE SERPL-SCNC: 96 MMOL/L (ref 96–112)
CO2 SERPL-SCNC: 24 MMOL/L (ref 20–33)
CREAT SERPL-MCNC: 0.44 MG/DL (ref 0.5–1.4)
GFR SERPLBLD CREATININE-BSD FMLA CKD-EPI: 108 ML/MIN/1.73 M 2
GLUCOSE SERPL-MCNC: 102 MG/DL (ref 65–99)
LIPASE SERPL-CCNC: 24 U/L (ref 11–82)
POTASSIUM SERPL-SCNC: 4.9 MMOL/L (ref 3.6–5.5)
PROT SERPL-MCNC: 7.4 G/DL (ref 6–8.2)
SODIUM SERPL-SCNC: 131 MMOL/L (ref 135–145)

## 2022-05-31 PROCEDURE — 83690 ASSAY OF LIPASE: CPT

## 2022-05-31 PROCEDURE — 36415 COLL VENOUS BLD VENIPUNCTURE: CPT

## 2022-05-31 PROCEDURE — 80076 HEPATIC FUNCTION PANEL: CPT

## 2022-05-31 PROCEDURE — 80048 BASIC METABOLIC PNL TOTAL CA: CPT

## 2022-06-09 LAB
H PYLORI AG STL QL IA: NORMAL
REQUEST PROBLEM   100552: NORMAL

## 2022-06-10 DIAGNOSIS — R19.7 DIARRHEA, UNSPECIFIED TYPE: ICD-10-CM

## 2022-06-10 LAB
FA STL QL: NORMAL
NEUTRAL FAT STL QL: NORMAL
WRITTEN AUTHORIZATION   977900: NORMAL

## 2022-06-16 ENCOUNTER — HOSPITAL ENCOUNTER (OUTPATIENT)
Facility: MEDICAL CENTER | Age: 67
End: 2022-06-16
Attending: FAMILY MEDICINE
Payer: MEDICAID

## 2022-06-16 DIAGNOSIS — R19.7 DIARRHEA, UNSPECIFIED TYPE: ICD-10-CM

## 2022-06-16 LAB — H PYLORI AG STL QL IA: NOT DETECTED

## 2022-06-16 PROCEDURE — 87338 HPYLORI STOOL AG IA: CPT

## 2022-06-16 PROCEDURE — 82705 FATS/LIPIDS FECES QUAL: CPT

## 2022-06-18 LAB
FAT STL QL: NORMAL
NEUTRAL FAT STL QL: NORMAL

## 2022-06-24 ENCOUNTER — OFFICE VISIT (OUTPATIENT)
Dept: MEDICAL GROUP | Facility: MEDICAL CENTER | Age: 67
End: 2022-06-24
Attending: FAMILY MEDICINE
Payer: MEDICAID

## 2022-06-24 VITALS
OXYGEN SATURATION: 98 % | HEIGHT: 65 IN | SYSTOLIC BLOOD PRESSURE: 124 MMHG | WEIGHT: 148 LBS | BODY MASS INDEX: 24.66 KG/M2 | RESPIRATION RATE: 16 BRPM | HEART RATE: 80 BPM | DIASTOLIC BLOOD PRESSURE: 76 MMHG | TEMPERATURE: 97.9 F

## 2022-06-24 DIAGNOSIS — R53.83 OTHER FATIGUE: ICD-10-CM

## 2022-06-24 DIAGNOSIS — Z12.31 ENCOUNTER FOR SCREENING MAMMOGRAM FOR MALIGNANT NEOPLASM OF BREAST: ICD-10-CM

## 2022-06-24 DIAGNOSIS — R19.7 DIARRHEA, UNSPECIFIED TYPE: ICD-10-CM

## 2022-06-24 PROCEDURE — 99214 OFFICE O/P EST MOD 30 MIN: CPT | Performed by: FAMILY MEDICINE

## 2022-06-24 PROCEDURE — 99213 OFFICE O/P EST LOW 20 MIN: CPT | Performed by: FAMILY MEDICINE

## 2022-06-24 NOTE — PROGRESS NOTES
"Subjective:     CC:   Chief Complaint   Patient presents with   • Follow-Up         HPI:     Diarrhea  Pt is continuing to have diarrhea  Labs were negative for fecal fat, h pylori.   Colonoscopy was normal 01/22 with only tubular adenomas noted on biopsy  She has been taking fiber supplementation inconsistently.   Still denies blood in the stool   Also still drinking too much alcohol and notes that she feels very aware that she is drinking more alcohol than she should.      No past medical history on file.    Social History     Tobacco Use   • Smoking status: Current Every Day Smoker     Packs/day: 0.50     Years: 38.00     Pack years: 19.00     Types: Cigarettes   • Smokeless tobacco: Never Used   Vaping Use   • Vaping Use: Never used   Substance Use Topics   • Alcohol use: Yes     Comment: 4 glasses of wine daily.   • Drug use: Never       Current Outpatient Medications Ordered in Epic   Medication Sig Dispense Refill   • lisinopril (PRINIVIL) 20 MG Tab TAKE 1 TABLET BY MOUTH EVERY DAY 90 Tablet 1   • atorvastatin (LIPITOR) 20 MG Tab TAKE 1 TABLET BY MOUTH EVERY DAY 30 Tablet 5   • nicotine polacrilex (NICORETTE) 4 MG gum Take 4 mg by mouth as needed. (Patient not taking: Reported on 6/24/2022) 270 Each 6     No current Epic-ordered facility-administered medications on file.       Allergies:  Seasonal      Objective:       Exam:  /76 (BP Location: Left arm, Patient Position: Sitting, BP Cuff Size: Adult)   Pulse 80   Temp 36.6 °C (97.9 °F) (Temporal)   Resp 16   Ht 1.651 m (5' 5\")   Wt 67.1 kg (148 lb)   SpO2 98%   BMI 24.63 kg/m²  Body mass index is 24.63 kg/m².    Constitutional: Alert, no distress, well-groomed.  Respiratory: Unlabored respiratory effort, no cough.  MSK: moves all extremities.  Psych: Alert and oriented x3, normal affect and mood.    Labs:   Neg fecal fat  Neg h pylori  Labs showing low sodium, mildly elevated glucose    Assessment & Plan:     66 y.o. female with the following - "     1. Diarrhea, unspecified type  Adivsed pt to f/u with GI  Consider IBS-D, SIBO. Advised to take metamucil daily, try peppermint oil, can do a 2 week trial of rifaximin in the future.   No IBD noted on recent c-scope  Also advised to continue working on decreasing alcohol to see if this might help    2. Other fatigue  - CBC WITHOUT DIFFERENTIAL; Future  - TSH WITH REFLEX TO FT4; Future  - HEMOGLOBIN A1C; Future  Complete labs if no improvement with cutting back on alcohol    3. Encounter for screening mammogram for malignant neoplasm of breast  - MA-SCREENING MAMMO BILAT W/TOMOSYNTHESIS W/CAD; Future      Return in about 6 months (around 12/24/2022).    Please note that this dictation was created using voice recognition software. I have made every reasonable attempt to correct obvious errors, but I expect that there are errors of grammar and possibly content that I did not discover before finalizing the note.

## 2022-06-24 NOTE — ASSESSMENT & PLAN NOTE
Pt is continuing to have diarrhea  Labs were negative for fecal fat, h pylori.   Colonoscopy was normal 01/22 with only tubular adenomas noted on biopsy  She has been taking fiber supplementation inconsistently.   Still denies blood in the stool   Also still drinking too much alcohol and notes that she feels very aware that she is drinking more alcohol than she should.

## 2022-07-01 ENCOUNTER — HOSPITAL ENCOUNTER (OUTPATIENT)
Dept: LAB | Facility: MEDICAL CENTER | Age: 67
End: 2022-07-01
Attending: FAMILY MEDICINE
Payer: MEDICAID

## 2022-07-01 ENCOUNTER — OFFICE VISIT (OUTPATIENT)
Dept: MEDICAL GROUP | Facility: MEDICAL CENTER | Age: 67
End: 2022-07-01
Attending: FAMILY MEDICINE
Payer: MEDICAID

## 2022-07-01 VITALS
RESPIRATION RATE: 16 BRPM | WEIGHT: 150 LBS | SYSTOLIC BLOOD PRESSURE: 138 MMHG | HEIGHT: 65 IN | HEART RATE: 83 BPM | TEMPERATURE: 98.1 F | OXYGEN SATURATION: 99 % | DIASTOLIC BLOOD PRESSURE: 82 MMHG | BODY MASS INDEX: 24.99 KG/M2

## 2022-07-01 DIAGNOSIS — R22.1 LUMP ON NECK: ICD-10-CM

## 2022-07-01 LAB
T3FREE SERPL-MCNC: 3.02 PG/ML (ref 2–4.4)
T4 FREE SERPL-MCNC: 1.02 NG/DL (ref 0.93–1.7)
TSH SERPL DL<=0.005 MIU/L-ACNC: 1.85 UIU/ML (ref 0.38–5.33)

## 2022-07-01 PROCEDURE — 84481 FREE ASSAY (FT-3): CPT

## 2022-07-01 PROCEDURE — 99214 OFFICE O/P EST MOD 30 MIN: CPT | Performed by: FAMILY MEDICINE

## 2022-07-01 PROCEDURE — 84443 ASSAY THYROID STIM HORMONE: CPT

## 2022-07-01 PROCEDURE — 84439 ASSAY OF FREE THYROXINE: CPT

## 2022-07-01 PROCEDURE — 99213 OFFICE O/P EST LOW 20 MIN: CPT | Performed by: FAMILY MEDICINE

## 2022-07-01 PROCEDURE — 36415 COLL VENOUS BLD VENIPUNCTURE: CPT

## 2022-07-01 NOTE — PROGRESS NOTES
"Subjective:     CC:   Chief Complaint   Patient presents with   • Bump         HPI:     Lump on neck  Hi this is DrCamron Noncompromised after set patient notes that she noticed a lump on her neck that popped up over the last few days.  It is nontender.  She has not been ill, no upper respiratory symptoms.  She is very anxious and concerned about this.  She is worried about cancer.  Last TSH was done in April 2021, this was normal.  She denies palpitations, constipation, hair falling out.  She has been having diarrhea that we have been evaluating.  She notes recent weight gain and fatigue.      History reviewed. No pertinent past medical history.    Social History     Tobacco Use   • Smoking status: Current Every Day Smoker     Packs/day: 0.50     Years: 38.00     Pack years: 19.00     Types: Cigarettes   • Smokeless tobacco: Never Used   Vaping Use   • Vaping Use: Never used   Substance Use Topics   • Alcohol use: Yes     Comment: 4 glasses of wine daily.   • Drug use: Never       Current Outpatient Medications Ordered in Epic   Medication Sig Dispense Refill   • lisinopril (PRINIVIL) 20 MG Tab TAKE 1 TABLET BY MOUTH EVERY DAY 90 Tablet 1   • atorvastatin (LIPITOR) 20 MG Tab TAKE 1 TABLET BY MOUTH EVERY DAY 30 Tablet 5   • nicotine polacrilex (NICORETTE) 4 MG gum Take 4 mg by mouth as needed. 270 Each 6     No current Epic-ordered facility-administered medications on file.       Allergies:  Seasonal      Objective:       Exam:  /82 (BP Location: Left arm, Patient Position: Sitting, BP Cuff Size: Adult)   Pulse 83   Temp 36.7 °C (98.1 °F) (Temporal)   Resp 16   Ht 1.651 m (5' 5\")   Wt 68 kg (150 lb)   SpO2 99%   BMI 24.96 kg/m²  Body mass index is 24.96 kg/m².    General: Normal appearing. No distress.  Neck: Soft, about 2cm rounded mobile mass palpated midline and slightly to the right, just superior to the collarbone  Lymph: No cervical or supraclavicular lymph nodes are palpable      Labs:   TSH 04/21 - " normal    Assessment & Plan:     66 y.o. female with the following -     1. Lump on neck  - TSH; Future  - T3 FREE; Future  - FREE THYROXINE; Future  - US-THYROID; Future  obtain US imaging, updated thyroid labs. Location seems appropriate to be thyroid related, but could be a LN.     Return in about 1 month (around 8/1/2022) for f/u imaging.    Please note that this dictation was created using voice recognition software. I have made every reasonable attempt to correct obvious errors, but I expect that there are errors of grammar and possibly content that I did not discover before finalizing the note.

## 2022-07-01 NOTE — ASSESSMENT & PLAN NOTE
Hi this is  Noncompromised after set patient notes that she noticed a lump on her neck that popped up over the last few days.  It is nontender.  She has not been ill, no upper respiratory symptoms.  She is very anxious and concerned about this.  She is worried about cancer.  Last TSH was done in April 2021, this was normal.  She denies palpitations, constipation, hair falling out.  She has been having diarrhea that we have been evaluating.  She notes recent weight gain and fatigue.

## 2022-07-08 ENCOUNTER — HOSPITAL ENCOUNTER (OUTPATIENT)
Dept: RADIOLOGY | Facility: MEDICAL CENTER | Age: 67
End: 2022-07-08
Attending: FAMILY MEDICINE
Payer: MEDICAID

## 2022-07-08 DIAGNOSIS — E04.1 THYROID NODULE: ICD-10-CM

## 2022-07-08 DIAGNOSIS — R22.1 LUMP ON NECK: ICD-10-CM

## 2022-07-08 PROCEDURE — 76536 US EXAM OF HEAD AND NECK: CPT

## 2022-07-12 ENCOUNTER — HOSPITAL ENCOUNTER (OUTPATIENT)
Dept: RADIOLOGY | Facility: MEDICAL CENTER | Age: 67
End: 2022-07-12
Attending: FAMILY MEDICINE
Payer: MEDICAID

## 2022-07-12 DIAGNOSIS — Z12.31 ENCOUNTER FOR SCREENING MAMMOGRAM FOR MALIGNANT NEOPLASM OF BREAST: ICD-10-CM

## 2022-07-12 PROCEDURE — 77063 BREAST TOMOSYNTHESIS BI: CPT

## 2022-07-20 ENCOUNTER — HOSPITAL ENCOUNTER (OUTPATIENT)
Dept: RADIOLOGY | Facility: MEDICAL CENTER | Age: 67
End: 2022-07-20
Attending: FAMILY MEDICINE
Payer: MEDICAID

## 2022-07-20 DIAGNOSIS — E04.1 THYROID NODULE: ICD-10-CM

## 2022-07-20 LAB — PATHOLOGY CONSULT NOTE: NORMAL

## 2022-07-20 PROCEDURE — 10006 FNA BX W/US GDN EA ADDL: CPT

## 2022-07-20 PROCEDURE — 88112 CYTOPATH CELL ENHANCE TECH: CPT

## 2022-07-20 RX ORDER — LIDOCAINE HYDROCHLORIDE 10 MG/ML
INJECTION, SOLUTION INFILTRATION; PERINEURAL
Status: ACTIVE
Start: 2022-07-20 | End: 2022-07-20

## 2022-07-20 NOTE — PROGRESS NOTES
US guided RIGHT thyroid nodule fine needle aspiration performed by Dr. TURNER. NON-SEDATION (no H&P required as this is a NON SEDATION procedure). RIGHT anterior aspect of neck access site. 2 jar(s) of cytolyt obtained and sent to pathology lab. Pt tolerated the procedure well. Pt thermodynamically stable pre/intra/post procedure. All questions and concerns answered prior to being d/c. Patient provided with appropriate education for procedure. Pt d/c w/ SELF.

## 2022-07-26 ENCOUNTER — HOSPITAL ENCOUNTER (OUTPATIENT)
Dept: RADIOLOGY | Facility: MEDICAL CENTER | Age: 67
End: 2022-07-26
Attending: FAMILY MEDICINE
Payer: MEDICAID

## 2022-07-26 DIAGNOSIS — E04.1 THYROID NODULE: ICD-10-CM

## 2022-07-26 DIAGNOSIS — R19.7 DIARRHEA, UNSPECIFIED TYPE: ICD-10-CM

## 2022-07-26 PROCEDURE — 76700 US EXAM ABDOM COMPLETE: CPT

## 2022-07-29 ENCOUNTER — OFFICE VISIT (OUTPATIENT)
Dept: MEDICAL GROUP | Facility: MEDICAL CENTER | Age: 67
End: 2022-07-29
Attending: FAMILY MEDICINE
Payer: MEDICAID

## 2022-07-29 ENCOUNTER — TELEPHONE (OUTPATIENT)
Dept: MEDICAL GROUP | Facility: MEDICAL CENTER | Age: 67
End: 2022-07-29
Payer: MEDICAID

## 2022-07-29 VITALS
TEMPERATURE: 97.6 F | HEIGHT: 65 IN | WEIGHT: 150 LBS | SYSTOLIC BLOOD PRESSURE: 122 MMHG | BODY MASS INDEX: 24.99 KG/M2 | OXYGEN SATURATION: 99 % | RESPIRATION RATE: 17 BRPM | HEART RATE: 83 BPM | DIASTOLIC BLOOD PRESSURE: 82 MMHG

## 2022-07-29 DIAGNOSIS — Z78.9 UNHEALTHY ALCOHOL DRINKING BEHAVIOR: ICD-10-CM

## 2022-07-29 DIAGNOSIS — R19.7 DIARRHEA, UNSPECIFIED TYPE: ICD-10-CM

## 2022-07-29 DIAGNOSIS — F17.200 TOBACCO DEPENDENCE: ICD-10-CM

## 2022-07-29 DIAGNOSIS — E04.1 THYROID NODULE: ICD-10-CM

## 2022-07-29 PROCEDURE — 99213 OFFICE O/P EST LOW 20 MIN: CPT | Performed by: FAMILY MEDICINE

## 2022-07-29 PROCEDURE — 99214 OFFICE O/P EST MOD 30 MIN: CPT | Mod: 25 | Performed by: FAMILY MEDICINE

## 2022-07-29 PROCEDURE — 99406 BEHAV CHNG SMOKING 3-10 MIN: CPT | Performed by: FAMILY MEDICINE

## 2022-07-29 RX ORDER — VARENICLINE TARTRATE 1 MG/1
TABLET, FILM COATED ORAL
Qty: 60 TABLET | Refills: 2 | Status: SHIPPED | OUTPATIENT
Start: 2022-07-29 | End: 2022-08-27

## 2022-07-29 RX ORDER — HYDROCODONE BITARTRATE AND ACETAMINOPHEN 7.5; 325 MG/1; MG/1
TABLET ORAL
Qty: 1 TABLET | Refills: 0 | Status: SHIPPED | OUTPATIENT
Start: 2022-07-29 | End: 2022-08-02 | Stop reason: SDUPTHER

## 2022-07-29 RX ORDER — VARENICLINE TARTRATE 0.5 MG/1
TABLET, FILM COATED ORAL
Qty: 11 TABLET | Refills: 0 | Status: SHIPPED | OUTPATIENT
Start: 2022-07-29 | End: 2022-08-27

## 2022-07-29 NOTE — TELEPHONE ENCOUNTER
Phone Number Called: [phone] (home)       Call outcome: Left detailed message for patient. Informed to call back with any additional questions.    Message: I called imaging to see if patient is allowed to take narcotics or opiates before FNA. They told me there are no drug restirctions, but that if she does take anything she will need to notify them before the procedure and organize a ride home from the procedure.     I called pt to let her know that Dr. Hernandez was going to order one norco for before the FNA and would send it over to her pharmacy.

## 2022-07-29 NOTE — ASSESSMENT & PLAN NOTE
Patient had 2 FNAs on 2 different nodules of the thyroid, unfortunately they were both nondiagnostic.  Patient states that the study was very uncomfortable.  She will likely need repeat FNA.  Referral has already been made to [facility].

## 2022-07-29 NOTE — ASSESSMENT & PLAN NOTE
She is currently smoking 0.75 ppd  She didn't feel like wellbutrin helped her much. She only took it for a couple of weeks

## 2022-07-29 NOTE — ASSESSMENT & PLAN NOTE
Pt is down to couple glasses of wine per night  Previously pt was drinking 4 glasses of wine per night.

## 2022-07-29 NOTE — PROGRESS NOTES
"Subjective:     CC:   Chief Complaint   Patient presents with   • Follow-Up         HPI:         Unhealthy alcohol drinking behavior  Pt is down to couple glasses of wine per night  Previously pt was drinking 4 glasses of wine per night.       Diarrhea  Pt reports that her diarrhea is much improved and is almost gone. She is quite convinced that it was diet related  She reports that she has decreased alcohol intake and is trying to \"eat healthier\"  Taking probiotic gummy, more fruits and vegetables.     Tobacco dependence  She is currently smoking 0.75 ppd  She didn't feel like wellbutrin helped her much. She only took it for a couple of weeks      Thyroid nodule  Patient had 2 FNAs on 2 different nodules of the thyroid, unfortunately they were both nondiagnostic.  Patient states that the study was very uncomfortable.  She will likely need repeat FNA.  Referral has already been made to [facility].      History reviewed. No pertinent past medical history.    Social History     Tobacco Use   • Smoking status: Current Every Day Smoker     Packs/day: 0.50     Years: 38.00     Pack years: 19.00     Types: Cigarettes   • Smokeless tobacco: Never Used   Vaping Use   • Vaping Use: Never used   Substance Use Topics   • Alcohol use: Yes     Comment: 4 glasses of wine daily.   • Drug use: Never       Current Outpatient Medications Ordered in Epic   Medication Sig Dispense Refill   • varenicline (CHANTIX) 0.5 MG tablet Day 1 to 3: take 1 tab once a day. Day 4-7: take 1 tab twice a day. Day 8 and thereafter: take 1mg tab twice a day 11 Tablet 0   • varenicline (CHANTIX) 1 MG tablet Start with 0.5mg tabs, then take twice a day by mouth on day 8 and thereafter 60 Tablet 2   • HYDROcodone-acetaminophen (NORCO) 7.5-325 MG tab Take 1 tablet 30 minutes prior to procedure 1 Tablet 0   • atorvastatin (LIPITOR) 20 MG Tab TAKE 1 TABLET BY MOUTH EVERY DAY 90 Tablet 1   • lisinopril (PRINIVIL) 20 MG Tab TAKE 1 TABLET BY MOUTH EVERY DAY 90 Tablet 1 " "  • nicotine polacrilex (NICORETTE) 4 MG gum Take 4 mg by mouth as needed. 270 Each 6     No current Epic-ordered facility-administered medications on file.       Allergies:  Seasonal        Objective:       Exam:  /82 (BP Location: Left arm, Patient Position: Sitting, BP Cuff Size: Adult)   Pulse 83   Temp 36.4 °C (97.6 °F) (Temporal)   Resp 17   Ht 1.651 m (5' 5\")   Wt 68 kg (150 lb)   SpO2 99%   BMI 24.96 kg/m²  Body mass index is 24.96 kg/m².    General: Normal appearing. No distress.  Neck: Supple. Thyroid is not enlarged.  Previously noted enlargement has decreased significantly  Pulmonary: Clear to ausculation.  Normal effort. No rales, ronchi, or wheezing.  Cardiovascular: Regular rate and rhythm without murmur.   Psych: Normal mood and affect. Alert and oriented x3. Judgment and insight is normal.      Labs:   Reviewed ultrasound of the abdomen, showing fatty liver.  An FNA pathology was reviewed, nondiagnostic    Assessment & Plan:     66 y.o. female with the following -     1. Thyroid nodule  - US-FNA BIOPSY W/ US GUIDANCE; Future  - HYDROcodone-acetaminophen (NORCO) 7.5-325 MG tab; Take 1 tablet 30 minutes prior to procedure  Dispense: 1 Tablet; Refill: 0  We will likely need repeat FNA.  I have also placed a referral to [facility] to follow with her biopsies in case she does have another nondiagnostic FNA.  I prescribed 1 Norco for her to take prior to her procedure.    2. Unhealthy alcohol drinking behavior  Patient is decreasing the amount she is drinking, from 4 glasses of wine per night now down to 2.  Her diarrhea has significantly improved with this change.    3. Diarrhea, unspecified type  Since she is almost resolved with changes in diet and decreasing alcohol intake    4. Tobacco dependence  - varenicline (CHANTIX) 0.5 MG tablet; Day 1 to 3: take 1 tab once a day. Day 4-7: take 1 tab twice a day. Day 8 and thereafter: take 1mg tab twice a day  Dispense: 11 Tablet; Refill: 0  - varenicline " (CHANTIX) 1 MG tablet; Start with 0.5mg tabs, then take twice a day by mouth on day 8 and thereafter  Dispense: 60 Tablet; Refill: 2  About 5 minutes spent on tobacco cessation counseling.  Patient is interested in trying Chantix.    Return After FNA.    Please note that this dictation was created using voice recognition software. I have made every reasonable attempt to correct obvious errors, but I expect that there are errors of grammar and possibly content that I did not discover before finalizing the note.       5

## 2022-07-29 NOTE — ASSESSMENT & PLAN NOTE
"Pt reports that her diarrhea is much improved and is almost gone. She is quite convinced that it was diet related  She reports that she has decreased alcohol intake and is trying to \"eat healthier\"  Taking probiotic gummy, more fruits and vegetables.   "

## 2022-08-02 ENCOUNTER — TELEPHONE (OUTPATIENT)
Dept: MEDICAL GROUP | Facility: MEDICAL CENTER | Age: 67
End: 2022-08-02
Payer: MEDICAID

## 2022-08-02 DIAGNOSIS — E04.1 THYROID NODULE: ICD-10-CM

## 2022-08-02 RX ORDER — HYDROCODONE BITARTRATE AND ACETAMINOPHEN 7.5; 325 MG/1; MG/1
1 TABLET ORAL
Qty: 1 TABLET | Refills: 0 | Status: SHIPPED | OUTPATIENT
Start: 2022-08-02 | End: 2022-08-06

## 2022-08-02 NOTE — TELEPHONE ENCOUNTER
Pharmacy faxed stating that the rx for the Norco needs a day supply, they stated it needs to be re-sent, please advise

## 2022-08-03 PROCEDURE — RXMED WILLOW AMBULATORY MEDICATION CHARGE: Performed by: FAMILY MEDICINE

## 2022-08-04 ENCOUNTER — TELEPHONE (OUTPATIENT)
Dept: MEDICAL GROUP | Facility: MEDICAL CENTER | Age: 67
End: 2022-08-04
Payer: MEDICAID

## 2022-08-04 NOTE — TELEPHONE ENCOUNTER
Phone Number Called: [phone]    Call outcome: Left detailed message for patient. Informed to call back with any additional questions.    Message: I called imaging and was transferred to specialty department where patient will complete biopsy (phone number listed above). I LVM asking about adding the AFFIRMA testing, and asked for a c/b

## 2022-08-04 NOTE — TELEPHONE ENCOUNTER
----- Message from Jamia Hernandez M.D. sent at 8/1/2022  9:06 PM PDT -----  Regarding: FW: [facility] referral  Can you please call for this patient's thyroid biopsy to add on AFFIRMA testing to her biopsies and if I need to reorder the biopsy?      ----- Message -----  From: ÁNGEL Monzon  Sent: 8/1/2022   3:39 PM PDT  To: Jamia Hernandez M.D.  Subject: [facility] referral                                     Hi Dr. Hernandez,     I reviewed the [facility] referral that you placed for this patient with a thyroid nodule with previous biopsies.  We contacted the patient today and looks like she is scheduled for another repeat biopsy on 8/9/2022.  If that biopsy comes back positive then she would need to be referred directly over to Dr. Marietta Jones, with [facility], thyroid surgeon.  If it comes back negative you may still want to refer her over to Dr. Jones for her opinion on how to continue to manage this.  1 thing I will definitely recommend that you do for the next biopsy scheduled for 8/9 is request that the specimen be sent off for AFFIRMA testing.  That way they take an extra sample and put it aside for the pathologists when they get nondiagnostic findings or a [place] category 3 finding, they can sign off for AFFIRMA testing and get better conclusions from a path perspective.    I will wait now until after her biopsy to really know if she needs to be seen by me or not.     Let me know if you have any questions,   Clarissa

## 2022-08-04 NOTE — TELEPHONE ENCOUNTER
Phone Number Called: [phone]    Call outcome: Spoke to patient regarding message below.    Message: I spoke with Lesa from imaging. She told me that a note just needs to be added to current order indicating need for AFFIRMA. Once added, I need to call Lesa again so that she makes a note before pt's appointment to ensure test is completed.

## 2022-08-05 ENCOUNTER — PHARMACY VISIT (OUTPATIENT)
Dept: PHARMACY | Facility: MEDICAL CENTER | Age: 67
End: 2022-08-05
Payer: COMMERCIAL

## 2022-08-05 DIAGNOSIS — E04.1 THYROID NODULE: ICD-10-CM

## 2022-08-06 NOTE — PROGRESS NOTES
Couldn't change pt's FNA biopsy order to include AFFIRMA testing so new order has been placed. Staff to call on Monday regarding this order.

## 2022-08-09 ENCOUNTER — HOSPITAL ENCOUNTER (OUTPATIENT)
Dept: RADIOLOGY | Facility: MEDICAL CENTER | Age: 67
End: 2022-08-09
Attending: FAMILY MEDICINE
Payer: MEDICAID

## 2022-08-09 ENCOUNTER — TELEPHONE (OUTPATIENT)
Dept: MEDICAL GROUP | Facility: MEDICAL CENTER | Age: 67
End: 2022-08-09
Payer: MEDICAID

## 2022-08-09 DIAGNOSIS — E04.1 THYROID NODULE: ICD-10-CM

## 2022-08-09 PROCEDURE — 10006 FNA BX W/US GDN EA ADDL: CPT

## 2022-08-09 PROCEDURE — 88173 CYTOPATH EVAL FNA REPORT: CPT | Mod: 91

## 2022-08-09 PROCEDURE — 88305 TISSUE EXAM BY PATHOLOGIST: CPT

## 2022-08-09 RX ORDER — LIDOCAINE HYDROCHLORIDE 10 MG/ML
INJECTION, SOLUTION EPIDURAL; INFILTRATION; INTRACAUDAL; PERINEURAL
Status: DISCONTINUED
Start: 2022-08-09 | End: 2022-08-10 | Stop reason: HOSPADM

## 2022-08-09 NOTE — TELEPHONE ENCOUNTER
Attempted to notify Lesa, left detailed voicemail for her requesting call back with any questions.

## 2022-08-09 NOTE — TELEPHONE ENCOUNTER
Caller Name: Lesa   Call Back Number: [phone]    How would the patient prefer to be contacted with a response: Phone call OK to leave a detailed message    Lesa called back to confirm that she received messages regarding change to pt's order. She was going to ensure affirma was run

## 2022-08-09 NOTE — PROGRESS NOTES
US guided right thyroid nodule fine needle aspiration done by Dr. Álvarez; NON-SEDATION (no H&P required as this is a NON SEDATION procedure) Right anterior aspect of neck access site; 2 jar of cytolyt, and 2 afirma, 5ml thyroid cyst sent to pathology obtained and sent to pathology lab; pt tolerated the procedure well; pt hemodynamically stable pre/intra/post procedure; all questions and concerns answered prior to being d/c; patient provided with appropriate education for procedure; pt d/c home.

## 2022-08-10 LAB — CYTOLOGY REG CYTOL: NORMAL

## 2022-08-17 DIAGNOSIS — E04.1 THYROID NODULE: ICD-10-CM

## 2022-12-07 DIAGNOSIS — I10 ESSENTIAL HYPERTENSION: ICD-10-CM

## 2022-12-07 RX ORDER — LISINOPRIL 20 MG/1
20 TABLET ORAL
Qty: 30 TABLET | Refills: 5 | Status: SHIPPED | OUTPATIENT
Start: 2022-12-07

## 2023-01-06 DIAGNOSIS — E78.5 HYPERLIPIDEMIA, UNSPECIFIED HYPERLIPIDEMIA TYPE: ICD-10-CM

## 2023-01-10 RX ORDER — ATORVASTATIN CALCIUM 20 MG/1
20 TABLET, FILM COATED ORAL DAILY
Qty: 30 TABLET | Refills: 5 | Status: SHIPPED | OUTPATIENT
Start: 2023-01-10 | End: 2023-04-19 | Stop reason: SDUPTHER

## 2023-04-19 DIAGNOSIS — E78.5 HYPERLIPIDEMIA, UNSPECIFIED HYPERLIPIDEMIA TYPE: ICD-10-CM

## 2023-04-19 NOTE — TELEPHONE ENCOUNTER
Received request via: Pharmacy    Was the patient seen in the last year in this department? Yes    Does the patient have an active prescription (recently filled or refills available) for medication(s) requested? No    Does the patient have [facility] and need 100 day supply (blood pressure, diabetes and cholesterol meds only)? Patient does not have SCP    [facility] is asking for 100 day supply. Rx to show change if appropriate

## 2023-04-20 RX ORDER — ATORVASTATIN CALCIUM 20 MG/1
20 TABLET, FILM COATED ORAL DAILY
Qty: 100 TABLET | Refills: 0 | Status: SHIPPED | OUTPATIENT
Start: 2023-04-20 | End: 2023-07-25

## 2023-07-22 DIAGNOSIS — E78.5 HYPERLIPIDEMIA, UNSPECIFIED HYPERLIPIDEMIA TYPE: ICD-10-CM

## 2023-07-25 RX ORDER — ATORVASTATIN CALCIUM 20 MG/1
20 TABLET, FILM COATED ORAL DAILY
Qty: 100 TABLET | Refills: 0 | Status: SHIPPED | OUTPATIENT
Start: 2023-07-25